# Patient Record
Sex: MALE | Race: WHITE | NOT HISPANIC OR LATINO | Employment: FULL TIME | URBAN - METROPOLITAN AREA
[De-identification: names, ages, dates, MRNs, and addresses within clinical notes are randomized per-mention and may not be internally consistent; named-entity substitution may affect disease eponyms.]

---

## 2017-04-24 ENCOUNTER — OFFICE VISIT (OUTPATIENT)
Dept: CARDIOLOGY | Facility: CLINIC | Age: 57
End: 2017-04-24

## 2017-04-24 VITALS
WEIGHT: 172 LBS | HEIGHT: 73 IN | HEART RATE: 71 BPM | SYSTOLIC BLOOD PRESSURE: 126 MMHG | DIASTOLIC BLOOD PRESSURE: 62 MMHG | BODY MASS INDEX: 22.8 KG/M2

## 2017-04-24 DIAGNOSIS — I42.1 HOCM (HYPERTROPHIC OBSTRUCTIVE CARDIOMYOPATHY) (HCC): Primary | ICD-10-CM

## 2017-04-24 PROCEDURE — 99213 OFFICE O/P EST LOW 20 MIN: CPT | Performed by: INTERNAL MEDICINE

## 2017-04-27 PROCEDURE — 93000 ELECTROCARDIOGRAM COMPLETE: CPT | Performed by: INTERNAL MEDICINE

## 2017-04-27 NOTE — PROGRESS NOTES
Subjective:     Encounter Date:04/24/2017      Patient ID: Dom Romo is a 57 y.o. male.    Chief Complaint: HOCM    History of Present Illness     Dear Dr. Omer,      I had the pleasure of seeing  the patient in cardiac followup today.  As you well know, he is a sebas 57-year-old man with a history of hypertrophic obstructive cardiomyopathy.  Many years ago we performed an alcohol septal ablation and he has done very well ever since. He has had no recurrent symptoms of heart failure or syncope.      He has been getting ready to go to [place] with his girlfriend. He plans to be on a plane for some time and do a lot of walking. He would like to try to decrease or eliminate his verapamil.           Review of Systems   All other systems reviewed and are negative.        ECG 12 Lead  Date/Time: 4/27/2017 11:38 AM  Performed by: DOM BROCK  Authorized by: DOM BROCK   Comparison: compared with previous ECG   Similar to previous ECG  Rhythm: sinus rhythm  BPM: 71  T depression: V3, V4, V5, V6, I, aVL, II, III and aVF                 Objective:     Physical Exam   Constitutional: He is oriented to person, place, and time. He appears well-developed and well-nourished.   HENT:   Head: Normocephalic and atraumatic.   Neck: Normal range of motion. Neck supple.   Cardiovascular: Normal rate, regular rhythm and normal heart sounds.    Pulmonary/Chest: Effort normal and breath sounds normal.   Abdominal: Soft. Bowel sounds are normal.   Musculoskeletal: Normal range of motion.   Neurological: He is alert and oriented to person, place, and time.   Skin: Skin is warm and dry.   Psychiatric: He has a normal mood and affect. His behavior is normal. Thought content normal.   Vitals reviewed.      Lab Review:       Assessment:          Diagnosis Plan   1. HOCM (hypertrophic obstructive cardiomyopathy)            Plan:        It was a pleasure to see the patient in cardiac followup today. He is doing very well from the  standpoint of his hypertrophic cardiomyopathy.  At this time, he has no symptoms of angina or heart failure.   He may cut his verapamil dose in half.   If he continues to do well in one month he can discontinue his verapamil at that time.       He will continue to see me once a year, or sooner if symptoms warrant.

## 2018-05-15 ENCOUNTER — OFFICE VISIT (OUTPATIENT)
Dept: CARDIOLOGY | Facility: CLINIC | Age: 58
End: 2018-05-15

## 2018-05-15 VITALS
BODY MASS INDEX: 23.06 KG/M2 | WEIGHT: 174 LBS | SYSTOLIC BLOOD PRESSURE: 120 MMHG | OXYGEN SATURATION: 100 % | HEART RATE: 77 BPM | DIASTOLIC BLOOD PRESSURE: 76 MMHG | HEIGHT: 73 IN

## 2018-05-15 DIAGNOSIS — I42.1 CARDIOMYOPATHY, HYPERTROPHIC OBSTRUCTIVE (HCC): Primary | ICD-10-CM

## 2018-05-15 PROBLEM — I51.9 CARDIAC DISEASE: Status: ACTIVE | Noted: 2018-05-15

## 2018-05-15 PROBLEM — F32.A DEPRESSION: Status: ACTIVE | Noted: 2018-05-15

## 2018-05-15 PROBLEM — F41.9 ANXIETY: Status: ACTIVE | Noted: 2018-05-15

## 2018-05-15 PROCEDURE — 99213 OFFICE O/P EST LOW 20 MIN: CPT | Performed by: PHYSICIAN ASSISTANT

## 2018-05-15 PROCEDURE — 93000 ELECTROCARDIOGRAM COMPLETE: CPT | Performed by: PHYSICIAN ASSISTANT

## 2018-05-15 NOTE — PROGRESS NOTES
Date of Office Visit: 05/15/2018  Encounter Provider: SCOT Gonzalez  Place of Service: [facility] CARDIOLOGY  Patient Name: Dom Romo  :1960    Chief Complaint   Patient presents with   • Cardiomyopathy     1 year follow up   :     HPI: Dom Romo is a 58 y.o. male, new to me, who presents today for follow-up.  Old records have been obtained and reviewed by me.  He is a patient of Dr. Espinoza's with a past cardiac history significant for hypertrophic obstructive cardiomyopathy.  He is status post alcohol septal ablation many years ago.  He was last in our office to see Dr. Espinoza on 2017.  At that visit, he was doing well without complaints of heart failure or syncope.  He was preparing to go to [place] with his girlfriend.  He was interested in decreasing or eliminating his verapamil.  Dr. Espinoza advised him to cut his dose of verapamil and half, and after 1 month.  He still doing well then to stop it altogether.  He is here today for yearly visit.   Over the past year he's been doing well.  He did take the trip to [place] with his girlfriend, who was born there.  He was gone for a month.  He walks about 5 miles a day, he measures this with his FitBit.  He denies any chest pain, shortness of breath, palpitations, edema, dizziness, or syncope.  He was able to discontinue his verapamil without difficulty.      Past Medical History:   Diagnosis Date   • Cardiomyopathy    • Depression    • HOCM (hypertrophic obstructive cardiomyopathy)        Past Surgical History:   Procedure Laterality Date   • CARDIAC ABLATION         Social History     Social History   • Marital status: Single     Spouse name: N/A   • Number of children: N/A   • Years of education: N/A     Occupational History   • Not on file.     Social History Main Topics   • Smoking status: Never Smoker   • Smokeless tobacco: Never Used      Comment: caffeine use   • Alcohol use 0.6 oz/week     1 Glasses of wine per week  "     Comment: rare   • Drug use: No   • Sexual activity: Defer     Other Topics Concern   • Not on file     Social History Narrative   • No narrative on file       Family History   Problem Relation Age of Onset   • Hypertension Other    • Stroke Other    • Hypotension Mother    • No Known Problems Father    • No Known Problems Maternal Grandmother    • No Known Problems Maternal Grandfather    • No Known Problems Paternal Grandmother    • No Known Problems Paternal Grandfather        Review of Systems   Constitution: Negative for chills, fever and malaise/fatigue.   Cardiovascular: Negative for chest pain, dyspnea on exertion, leg swelling, near-syncope, orthopnea, palpitations, paroxysmal nocturnal dyspnea and syncope.   Respiratory: Negative for cough and shortness of breath.    Musculoskeletal: Negative for joint pain, joint swelling and myalgias.   Gastrointestinal: Negative for abdominal pain, diarrhea, melena, nausea and vomiting.   Genitourinary: Negative for frequency and hematuria.   Neurological: Negative for light-headedness, numbness, paresthesias and seizures.   Allergic/Immunologic: Negative.    All other systems reviewed and are negative.      No Known Allergies    No current outpatient prescriptions on file.      Objective:     Vitals:    05/15/18 1357 05/15/18 1406   BP: 116/72 120/76   BP Location: Right arm Left arm   Pulse: 77    SpO2: 100%    Weight: 78.9 kg (174 lb)    Height: 185.4 cm (73\")      Body mass index is 22.96 kg/m².    PHYSICAL EXAM:    Physical Exam   Constitutional: He is oriented to person, place, and time. He appears well-developed and well-nourished. No distress.   HENT:   Head: Normocephalic and atraumatic.   Eyes: Pupils are equal, round, and reactive to light.   Neck: No JVD present. No thyromegaly present.   Cardiovascular: Normal rate, regular rhythm, normal heart sounds and intact distal pulses.    No murmur heard.  Pulmonary/Chest: Effort normal and breath sounds normal. " No respiratory distress.   Abdominal: Soft. Bowel sounds are normal. He exhibits no distension. There is no splenomegaly or hepatomegaly. There is no tenderness.   Musculoskeletal: Normal range of motion. He exhibits no edema.   Neurological: He is alert and oriented to person, place, and time.   Skin: Skin is warm and dry. He is not diaphoretic. No erythema.   Psychiatric: He has a normal mood and affect. His behavior is normal. Judgment normal.         ECG 12 Lead  Date/Time: 5/15/2018 2:15 PM  Performed by: GUNJAN RIVERA.  Authorized by: GUNJAN RIVERA   Comparison: compared with previous ECG from 4/24/2017  Similar to previous ECG  Rhythm: sinus rhythm  BPM: 74  Clinical impression: normal ECG  Comments: Indication: Hypertrophic obstructive cardiomyopathy.              Assessment:       Diagnosis Plan   1. Cardiomyopathy, hypertrophic obstructive  ECG 12 Lead     Orders Placed This Encounter   Procedures   • ECG 12 Lead     This order was created via procedure documentation          Plan:       Overall he's doing well.  He has no symptoms of heart failure or syncope.  He gets plenty of exercise without difficulty.  I'm not going to make any changes to his medical regimen today, and he will follow-up with Dr. Espinoza in 1 year.    As always, it has been a pleasure to participate in your patient's care.      Sincerely,         Gunjan Rivera PA-C

## 2019-06-24 ENCOUNTER — OFFICE VISIT (OUTPATIENT)
Dept: CARDIOLOGY | Facility: CLINIC | Age: 59
End: 2019-06-24

## 2019-06-24 VITALS
HEART RATE: 80 BPM | DIASTOLIC BLOOD PRESSURE: 94 MMHG | WEIGHT: 176.2 LBS | SYSTOLIC BLOOD PRESSURE: 148 MMHG | HEIGHT: 73 IN | OXYGEN SATURATION: 96 % | BODY MASS INDEX: 23.35 KG/M2

## 2019-06-24 DIAGNOSIS — I42.1 CARDIOMYOPATHY, HYPERTROPHIC OBSTRUCTIVE (HCC): Primary | ICD-10-CM

## 2019-06-24 PROCEDURE — 99213 OFFICE O/P EST LOW 20 MIN: CPT | Performed by: INTERNAL MEDICINE

## 2019-06-24 PROCEDURE — 93000 ELECTROCARDIOGRAM COMPLETE: CPT | Performed by: INTERNAL MEDICINE

## 2019-06-27 NOTE — PROGRESS NOTES
Subjective:     Encounter Date:06/24/2019      Patient ID: Dom Romo is a 59 y.o. male.    [facility]    History of Present Illness    Dear Dr. Omer:    I had the pleasure of seeing your patient in cardiac followup today. As you well know, he is a sebas 59-year-old man with history of hypertrophic obstructive cardiomyopathy. He had an alcohol septal ablation many years ago and did well. He has not had heart failure symptoms since.    He is now engaged to be . He visited [place] to see his wife's parents. He was there for 6 weeks climbing up and down mountains without any symptoms. He no longer takes any medicines at his request.        Review of Systems   All other systems reviewed and are negative.        ECG 12 Lead  Date/Time: 6/24/2019 1:34 PM  Performed by: Dom Espinoza MD  Authorized by: Dom Espinoza MD   Comparison: compared with previous ECG   Similar to previous ECG  Rhythm: sinus rhythm  BPM: 74  Other findings: left ventricular hypertrophy               Objective:     Physical Exam   Constitutional: He is oriented to person, place, and time. He appears well-developed and well-nourished.   HENT:   Head: Normocephalic and atraumatic.   Neck: Normal range of motion. Neck supple.   Cardiovascular: Normal rate, regular rhythm and normal heart sounds.   Pulmonary/Chest: Effort normal and breath sounds normal.   Abdominal: Soft. Bowel sounds are normal.   Musculoskeletal: Normal range of motion.   Neurological: He is alert and oriented to person, place, and time.   Skin: Skin is warm and dry.   Psychiatric: He has a normal mood and affect. His behavior is normal. Thought content normal.   Vitals reviewed.      Lab Review:       Assessment:          Diagnosis Plan   1. Cardiomyopathy, hypertrophic obstructive (CMS/HCC)            Plan:       It was a pleasure to see your patient in cardiac follow up today.  He is doing very well from the cardiac standpoint without any complaints.  I have  asked him to watch his blood pressure closely.  If it remains elevated, he should consider going back on beta blocker.  I am pleased that he is asymptomatic from his HOCM.  He will see us again in 1 year or sooner as symptoms warrant.

## 2020-06-26 ENCOUNTER — OFFICE VISIT (OUTPATIENT)
Dept: CARDIOLOGY | Facility: CLINIC | Age: 60
End: 2020-06-26

## 2020-06-26 VITALS
WEIGHT: 171 LBS | DIASTOLIC BLOOD PRESSURE: 86 MMHG | HEIGHT: 73 IN | HEART RATE: 89 BPM | BODY MASS INDEX: 22.66 KG/M2 | SYSTOLIC BLOOD PRESSURE: 124 MMHG

## 2020-06-26 DIAGNOSIS — I42.1 CARDIOMYOPATHY, HYPERTROPHIC OBSTRUCTIVE (HCC): Primary | ICD-10-CM

## 2020-06-26 DIAGNOSIS — I48.0 PAROXYSMAL ATRIAL FIBRILLATION (HCC): ICD-10-CM

## 2020-06-26 PROCEDURE — 93000 ELECTROCARDIOGRAM COMPLETE: CPT | Performed by: INTERNAL MEDICINE

## 2020-06-26 PROCEDURE — 99204 OFFICE O/P NEW MOD 45 MIN: CPT | Performed by: INTERNAL MEDICINE

## 2020-06-26 NOTE — PROGRESS NOTES
Subjective:     Encounter Date:06/26/2020      Patient ID: Dom Romo is a 60 y.o. male.    Chief Complaint: Hypertrophic cardiomyopathy    HPI:   This is a 60-year-old man he was previously followed by Dr. Donaldo Espinoza.  He has a history of hypertrophic obstructive cardiomyopathy.  He himself is never had a sudden death.  He did undergo alcohol septal ablation many years ago.  None of the prior testing is available to me; I do not know any of his previous gradients.  He says that since his ablation he is done quite well.  He has had no arrhythmias or symptoms or signs of heart failure.    His brother did have sudden death related to HOCM.     Today he presents for follow-up.  He has no complaints.  He had an EKG which shows new atrial fibrillation which he believes is a new diagnosis for him.  He notes that on his apple watch the last 2 weeks he has been getting alerts for an irregular heartbeat     He does yard work for exercise and has been feeling well doing that.  He had one episode of dizziness which she attributed to working in the heat.  He does not smoke and drinks alcohol occasionally.    The following portions of the patient's history were reviewed and updated as appropriate: allergies, current medications, past family history, past medical history, past social history, past surgical history and problem list.     REVIEW OF SYSTEMS:   All systems reviewed.  Pertinent positives identified in HPI.  All other systems are negative.    Past Medical History:   Diagnosis Date   • Cardiomyopathy (CMS/HCC)    • Depression    • HOCM (hypertrophic obstructive cardiomyopathy) (CMS/HCC)        Family History   Problem Relation Age of Onset   • Hypertension Other    • Stroke Other    • Hypotension Mother    • No Known Problems Father    • No Known Problems Maternal Grandmother    • No Known Problems Maternal Grandfather    • No Known Problems Paternal Grandmother    • No Known Problems Paternal Grandfather         Social History     Socioeconomic History   • Marital status: Single     Spouse name: Not on file   • Number of children: Not on file   • Years of education: Not on file   • Highest education level: Not on file   Tobacco Use   • Smoking status: Never Smoker   • Smokeless tobacco: Never Used   • Tobacco comment: caffeine use   Substance and Sexual Activity   • Alcohol use: Yes     Alcohol/week: 1.0 standard drinks     Types: 1 Glasses of wine per week     Comment: rare   • Drug use: No   • Sexual activity: Defer       No Known Allergies    Past Surgical History:   Procedure Laterality Date   • CARDIAC ABLATION           ECG 12 Lead  Date/Time: 6/26/2020 3:07 PM  Performed by: Krys Villasenor MD  Authorized by: Krys Villasenor MD   Comparison: compared with previous ECG from 6/24/2019  Rhythm: atrial fibrillation  Rate: normal  Conduction: conduction normal  ST Segments: ST segments normal  T inversion: V5 and V6  QRS axis: normal  Other findings: non-specific ST-T wave changes and left ventricular hypertrophy    Clinical impression: abnormal EKG               Objective:         PHYSICAL EXAM:  GEN: VSS, no distress,   Eyes: normal sclera, normal lids and lashes  HENT: moist mucus membranes,   Respiratory: CTAB, no rales or wheezes  CV: irregular RR, no murmurs, , +2 DP and 2+ carotid pulses b/l  GI: NABS, soft,  Nontender, nondistended  MSK: no edema, no scoliosis or kyphosis  Skin: no rash, warm, dry  Heme/Lymph: no bruising or bleeding  Psych: organized thought, normal behavior and affect  Neuro: Cranial nerves grossly intact, Alert and Oriented x 3.         Assessment:          Diagnosis Plan   1. Cardiomyopathy, hypertrophic obstructive (CMS/HCC)     2. Paroxysmal atrial fibrillation (CMS/HCC)            Plan:       1. HOCM: s/p alcohol septal ablation. RF for sudden death include his brother's sudden death. No recent echo to assess gradient. Will repeat today.   2. AF: new diagnosis. He is asymptomatic,  though a few weeks ago did have some dizziness when working in the heat. Based on the echo we can make further decisions on cardioversion, etc. I will plan to get a ZIO to assess AF burden as well; despite his CHADSVASC of 0 I think given his HoCM if he has a large burden of AF he would justify anticoagulation given higher stroke risk in these patients.      Dr. Omer,  thank you very much for referring this kind patient to me. Please call me with any questions or concerns. I will see the patient again in the office in 3 months or sooner based on results of his testing.          Krys Villasenor MD  06/26/20  [facility]    No outpatient encounter medications on file as of 6/26/2020.     No facility-administered encounter medications on file as of 6/26/2020.

## 2020-07-17 ENCOUNTER — TELEPHONE (OUTPATIENT)
Dept: CARDIOLOGY | Facility: CLINIC | Age: 60
End: 2020-07-17

## 2020-07-17 DIAGNOSIS — I42.1 CARDIOMYOPATHY, HYPERTROPHIC OBSTRUCTIVE (HCC): Primary | ICD-10-CM

## 2020-07-17 RX ORDER — VERAPAMIL HYDROCHLORIDE 240 MG/1
240 TABLET, FILM COATED, EXTENDED RELEASE ORAL DAILY
Qty: 30 TABLET | Refills: 11 | Status: SHIPPED | OUTPATIENT
Start: 2020-07-17 | End: 2020-08-04 | Stop reason: SDUPTHER

## 2020-07-17 NOTE — TELEPHONE ENCOUNTER
Long discussion about ZIO results. Having 100% AF with rates up to 190.  Plan:   Previously tolerated verapamil- start 240 daily  Start Xarelto  Echo   Recent thyroid labs have been normal. He has not been drinking excessively.

## 2020-07-17 NOTE — TELEPHONE ENCOUNTER
Not yet discussed with patient but, after echo based on measurements will have to discuss if ICD should be considered given fast and long NSVT noted on ZIO- increasing risk of ventricular arrhythmia but he has no other risk factors for SCD (no syncope, personal or family history SCD etc)

## 2020-07-30 ENCOUNTER — HOSPITAL ENCOUNTER (OUTPATIENT)
Dept: CARDIOLOGY | Facility: HOSPITAL | Age: 60
Discharge: HOME | End: 2020-07-30
Admitting: INTERNAL MEDICINE

## 2020-07-30 VITALS
HEIGHT: 73 IN | BODY MASS INDEX: 22.66 KG/M2 | WEIGHT: 171 LBS | HEART RATE: 80 BPM | SYSTOLIC BLOOD PRESSURE: 124 MMHG | DIASTOLIC BLOOD PRESSURE: 86 MMHG

## 2020-07-30 DIAGNOSIS — I42.1 CARDIOMYOPATHY, HYPERTROPHIC OBSTRUCTIVE (HCC): ICD-10-CM

## 2020-07-30 LAB
AORTIC ARCH: 2.9 CM
ASCENDING AORTA: 3.7 CM
BH CV ECHO MEAS - ACS: 2.2 CM
BH CV ECHO MEAS - AO MAX PG (FULL): 1.8 MMHG
BH CV ECHO MEAS - AO MAX PG: 4.2 MMHG
BH CV ECHO MEAS - AO MEAN PG (FULL): 1 MMHG
BH CV ECHO MEAS - AO MEAN PG: 2 MMHG
BH CV ECHO MEAS - AO V2 MAX: 103 CM/SEC
BH CV ECHO MEAS - AO V2 MEAN: 54.8 CM/SEC
BH CV ECHO MEAS - AO V2 VTI: 15.8 CM
BH CV ECHO MEAS - ASC AORTA: 3.7 CM
BH CV ECHO MEAS - BSA(HAYCOCK): 2 M^2
BH CV ECHO MEAS - BSA: 2 M^2
BH CV ECHO MEAS - BZI_BMI: 22.6 KILOGRAMS/M^2
BH CV ECHO MEAS - BZI_METRIC_HEIGHT: 185.4 CM
BH CV ECHO MEAS - BZI_METRIC_WEIGHT: 77.6 KG
BH CV ECHO MEAS - EDV(MOD-SP2): 56 ML
BH CV ECHO MEAS - EDV(MOD-SP4): 50 ML
BH CV ECHO MEAS - EDV(TEICH): 123.8 ML
BH CV ECHO MEAS - EF(CUBED): 64.8 %
BH CV ECHO MEAS - EF(MOD-BP): 51.4 %
BH CV ECHO MEAS - EF(MOD-SP2): 51.8 %
BH CV ECHO MEAS - EF(MOD-SP4): 50 %
BH CV ECHO MEAS - EF(TEICH): 56 %
BH CV ECHO MEAS - ESV(MOD-SP2): 27 ML
BH CV ECHO MEAS - ESV(MOD-SP4): 25 ML
BH CV ECHO MEAS - ESV(TEICH): 54.4 ML
BH CV ECHO MEAS - FS: 29.4 %
BH CV ECHO MEAS - IVS/LVPW: 1.1
BH CV ECHO MEAS - IVSD: 1 CM
BH CV ECHO MEAS - LV DIASTOLIC VOL/BSA (35-75): 24.8 ML/M^2
BH CV ECHO MEAS - LV MASS(C)D: 175.6 GRAMS
BH CV ECHO MEAS - LV MASS(C)DI: 87.2 GRAMS/M^2
BH CV ECHO MEAS - LV MAX PG: 2.5 MMHG
BH CV ECHO MEAS - LV MEAN PG: 1 MMHG
BH CV ECHO MEAS - LV SYSTOLIC VOL/BSA (12-30): 12.4 ML/M^2
BH CV ECHO MEAS - LV V1 MAX: 78.3 CM/SEC
BH CV ECHO MEAS - LV V1 MEAN: 45.5 CM/SEC
BH CV ECHO MEAS - LV V1 VTI: 11.5 CM
BH CV ECHO MEAS - LVIDD: 5.1 CM
BH CV ECHO MEAS - LVIDS: 3.6 CM
BH CV ECHO MEAS - LVLD AP2: 7.9 CM
BH CV ECHO MEAS - LVLD AP4: 7.3 CM
BH CV ECHO MEAS - LVLS AP2: 7.2 CM
BH CV ECHO MEAS - LVLS AP4: 6.7 CM
BH CV ECHO MEAS - LVPWD: 0.9 CM
BH CV ECHO MEAS - MR MAX PG: 74 MMHG
BH CV ECHO MEAS - MR MAX VEL: 430 CM/SEC
BH CV ECHO MEAS - MV DEC SLOPE: 335 CM/SEC^2
BH CV ECHO MEAS - MV DEC TIME: 0.21 SEC
BH CV ECHO MEAS - MV E MAX VEL: 69 CM/SEC
BH CV ECHO MEAS - MV MAX PG: 3.1 MMHG
BH CV ECHO MEAS - MV MEAN PG: 1 MMHG
BH CV ECHO MEAS - MV P1/2T MAX VEL: 71.6 CM/SEC
BH CV ECHO MEAS - MV P1/2T: 62.6 MSEC
BH CV ECHO MEAS - MV V2 MAX: 88.3 CM/SEC
BH CV ECHO MEAS - MV V2 MEAN: 47.1 CM/SEC
BH CV ECHO MEAS - MV V2 VTI: 18.6 CM
BH CV ECHO MEAS - MVA P1/2T LCG: 3.1 CM^2
BH CV ECHO MEAS - MVA(P1/2T): 3.5 CM^2
BH CV ECHO MEAS - PA MAX PG (FULL): 1.7 MMHG
BH CV ECHO MEAS - PA MAX PG: 2.3 MMHG
BH CV ECHO MEAS - PA V2 MAX: 76.6 CM/SEC
BH CV ECHO MEAS - PVA(V,A): 2.3 CM^2
BH CV ECHO MEAS - PVA(V,D): 2.3 CM^2
BH CV ECHO MEAS - RAP SYSTOLE: 8 MMHG
BH CV ECHO MEAS - RV MAX PG: 0.61 MMHG
BH CV ECHO MEAS - RV MEAN PG: 0 MMHG
BH CV ECHO MEAS - RV V1 MAX: 39.2 CM/SEC
BH CV ECHO MEAS - RV V1 MEAN: 26.1 CM/SEC
BH CV ECHO MEAS - RV V1 VTI: 8.3 CM
BH CV ECHO MEAS - RVOT AREA: 4.5 CM^2
BH CV ECHO MEAS - RVOT DIAM: 2.4 CM
BH CV ECHO MEAS - RVSP: 34 MMHG
BH CV ECHO MEAS - SI(CUBED): 42.7 ML/M^2
BH CV ECHO MEAS - SI(MOD-SP2): 14.4 ML/M^2
BH CV ECHO MEAS - SI(MOD-SP4): 12.4 ML/M^2
BH CV ECHO MEAS - SI(TEICH): 34.5 ML/M^2
BH CV ECHO MEAS - SUP REN AO DIAM: 1.7 CM
BH CV ECHO MEAS - SV(CUBED): 86 ML
BH CV ECHO MEAS - SV(MOD-SP2): 29 ML
BH CV ECHO MEAS - SV(MOD-SP4): 25 ML
BH CV ECHO MEAS - SV(RVOT): 37.7 ML
BH CV ECHO MEAS - SV(TEICH): 69.4 ML
BH CV ECHO MEAS - TAPSE (>1.6): 1.9 CM2
BH CV ECHO MEAS - TR MAX VEL: 255 CM/SEC
BH CV XLRA - RV BASE: 3.5 CM
BH CV XLRA - RV LENGTH: 6.1 CM
BH CV XLRA - RV MID: 2.7 CM
BH CV XLRA - TDI S': 10 CM/SEC
LEFT ATRIUM VOLUME INDEX: 37 ML/M2
LV EF 2D ECHO EST: 51 %
MAXIMAL PREDICTED HEART RATE: 160 BPM
SINUS: 4 CM
STJ: 3 CM
STRESS TARGET HR: 136 BPM

## 2020-07-30 PROCEDURE — 93306 TTE W/DOPPLER COMPLETE: CPT

## 2020-07-30 PROCEDURE — 93306 TTE W/DOPPLER COMPLETE: CPT | Performed by: INTERNAL MEDICINE

## 2020-07-30 PROCEDURE — 93356 MYOCRD STRAIN IMG SPCKL TRCK: CPT | Performed by: INTERNAL MEDICINE

## 2020-07-30 PROCEDURE — 93356 MYOCRD STRAIN IMG SPCKL TRCK: CPT

## 2020-08-03 ENCOUNTER — TELEPHONE (OUTPATIENT)
Dept: CARDIOLOGY | Facility: CLINIC | Age: 60
End: 2020-08-03

## 2020-08-03 NOTE — TELEPHONE ENCOUNTER
Pt called back and left message stating it still feels like his heart is racing and there is no change since starting verapamil.

## 2020-08-03 NOTE — TELEPHONE ENCOUNTER
Called patient to give results of echo. Left VM to call me back along with how his HR has been doing wince starting verapamil.

## 2020-08-04 RX ORDER — VERAPAMIL HYDROCHLORIDE 240 MG/1
TABLET, FILM COATED, EXTENDED RELEASE ORAL
Qty: 30 TABLET | Refills: 11 | Status: SHIPPED | OUTPATIENT
Start: 2020-08-04 | End: 2020-09-08 | Stop reason: SDUPTHER

## 2020-08-04 NOTE — TELEPHONE ENCOUNTER
Pt has not been checking bp or hr since starting medication. He states the racing heart usually happens after he takes the verapamil in the evening.     He is also stating that he has noted some swelling in the evening when he takes his socks and shoes off,  No sob or chest pain

## 2020-08-07 ENCOUNTER — TELEPHONE (OUTPATIENT)
Dept: CARDIOLOGY | Facility: CLINIC | Age: 60
End: 2020-08-07

## 2020-08-07 NOTE — TELEPHONE ENCOUNTER
Pt called in follow up bp readings . Pt is not c/o any symptoms.    Currently taking     xarelto 20mg daily   Verapamil  mg 1.5 tab every evening      08/4    /83 hr 78   /80  Hr 88 (afib) per apple watch    08/05   AM  126/78 hr 84   PM  131/78  Hr 99 (afib)    08/06    AM  102/67  Hr 64   PM  134/79  Hr 91 (afib)    08/07   AM  117/69  Hr 61 (afib)

## 2020-09-08 ENCOUNTER — TELEPHONE (OUTPATIENT)
Dept: CARDIOLOGY | Facility: CLINIC | Age: 60
End: 2020-09-08

## 2020-09-08 RX ORDER — VERAPAMIL HYDROCHLORIDE 240 MG/1
TABLET, FILM COATED, EXTENDED RELEASE ORAL
Qty: 135 TABLET | Refills: 1 | Status: SHIPPED | OUTPATIENT
Start: 2020-09-08 | End: 2021-03-04

## 2020-09-18 ENCOUNTER — OFFICE VISIT (OUTPATIENT)
Dept: CARDIOLOGY | Facility: CLINIC | Age: 60
End: 2020-09-18

## 2020-09-18 VITALS
SYSTOLIC BLOOD PRESSURE: 144 MMHG | HEIGHT: 73 IN | WEIGHT: 170 LBS | HEART RATE: 85 BPM | BODY MASS INDEX: 22.53 KG/M2 | DIASTOLIC BLOOD PRESSURE: 96 MMHG

## 2020-09-18 DIAGNOSIS — I48.91 ATRIAL FIBRILLATION, UNSPECIFIED TYPE (HCC): ICD-10-CM

## 2020-09-18 DIAGNOSIS — I42.1 CARDIOMYOPATHY, HYPERTROPHIC OBSTRUCTIVE (HCC): Primary | ICD-10-CM

## 2020-09-18 PROCEDURE — 93000 ELECTROCARDIOGRAM COMPLETE: CPT | Performed by: INTERNAL MEDICINE

## 2020-09-18 PROCEDURE — 99214 OFFICE O/P EST MOD 30 MIN: CPT | Performed by: INTERNAL MEDICINE

## 2020-09-18 NOTE — PROGRESS NOTES
Subjective:     Encounter Date: 09/18/20      Patient ID: Dom Romo is a 60 y.o. male.    Chief Complaint: Hypertrophic cardiomyopathy    HPI:   This is a 60-year-old man he was previously followed by Dr. Donaldo Espinoza.  He has a history of hypertrophic obstructive cardiomyopathy.  He himself has never had a sudden death.  He did undergo alcohol septal ablation many years ago.  His brother did have sudden death related to HOCM.     The last time I saw him his Apple Watch was alerting him to episodes of AF. He had an EKG showing AF. He was started on Xarelto and Verapamil for rate control. He had an echo which showed no residual gradient and normal function.   He has tolerated his new meds well. He has rare alerts of AF with rates in the 100s infrequently. He has no dizziness, palpitations, or syncope. No angina or orhtopnea/PND. Since starting verapamil he has had some mild LE edema which is not bothersome. No rectal bleeding noticed.     He walks 4-5 times per week for exercise. He is nervous about the election.   He does not smoke and drinks alcohol occasionally.    The following portions of the patient's history were reviewed and updated as appropriate: allergies, current medications, past family history, past medical history, past social history, past surgical history and problem list.     REVIEW OF SYSTEMS:   All systems reviewed.  Pertinent positives identified in HPI.  All other systems are negative.    Past Medical History:   Diagnosis Date   • Cardiomyopathy (CMS/HCC)    • Depression    • HOCM (hypertrophic obstructive cardiomyopathy) (CMS/HCC)        Family History   Problem Relation Age of Onset   • Hypertension Other    • Stroke Other    • Hypotension Mother    • No Known Problems Father    • No Known Problems Maternal Grandmother    • No Known Problems Maternal Grandfather    • No Known Problems Paternal Grandmother    • No Known Problems Paternal Grandfather        Social History     Socioeconomic  History   • Marital status: Single     Spouse name: Not on file   • Number of children: Not on file   • Years of education: Not on file   • Highest education level: Not on file   Tobacco Use   • Smoking status: Never Smoker   • Smokeless tobacco: Never Used   • Tobacco comment: caffeine use   Substance and Sexual Activity   • Alcohol use: Yes     Alcohol/week: 1.0 standard drinks     Types: 1 Glasses of wine per week     Comment: rare   • Drug use: No   • Sexual activity: Defer       No Known Allergies    Past Surgical History:   Procedure Laterality Date   • CARDIAC ABLATION           ECG 12 Lead    Date/Time: 9/18/2020 5:46 PM  Performed by: Krys Villasenor MD  Authorized by: Krys Villasenor MD   Comparison: compared with previous ECG from 6/26/2020  Similar to previous ECG  Rhythm: sinus rhythm  Rate: normal  Conduction: conduction normal  ST Segments: ST segments normal  T inversion: V6 and V5  T flattening: II, III, aVF, I and aVL  QRS axis: normal  Other findings: non-specific ST-T wave changes    Clinical impression: abnormal EKG             Objective:         PHYSICAL EXAM:  GEN: VSS, no distress,   Eyes: normal sclera, normal lids and lashes  HENT: moist mucus membranes,   Respiratory: CTAB, no rales or wheezes  CV: irregular RR, no murmurs, , +2 DP and 2+ carotid pulses b/l  GI: NABS, soft,  Nontender, nondistended  MSK: no edema, no scoliosis or kyphosis  Skin: no rash, warm, dry  Heme/Lymph: no bruising or bleeding  Psych: organized thought, normal behavior and affect  Neuro: Cranial nerves grossly intact, Alert and Oriented x 3.         Assessment:          Diagnosis Plan   1. Cardiomyopathy, hypertrophic obstructive (CMS/HCC)     2. Atrial fibrillation, unspecified type (CMS/HCC)            Plan:       1. HOCM: s/p alcohol septal ablation. RF for sudden death include his brother's sudden death. Repeat echo shows no gradient.   2. AF: Asymptomatic. High risk of stroke in HOCM patients with AF no matter  the CHADSVASC score, will therefore plan for indefinite AC with Xarelto. Has some mild edema with verapamil, but he prefers to keep this medication rather than attempting a BB. Continue to monitoir.     Dr. Omer,  thank you very much for referring this kind patient to me. Please call me with any questions or concerns. I will see the patient again in the office in 6  Months.       Krys Villasenor MD  09/18/20  [facility]    Outpatient Encounter Medications as of 9/18/2020   Medication Sig Dispense Refill   • rivaroxaban (XARELTO) 20 MG tablet Take 1 tablet by mouth Daily. 90 tablet 1   • verapamil SR (CALAN-SR) 240 MG CR tablet 1.5 tablets daily 135 tablet 1     No facility-administered encounter medications on file as of 9/18/2020.

## 2021-03-04 RX ORDER — RIVAROXABAN 20 MG/1
TABLET, FILM COATED ORAL
Qty: 90 TABLET | Refills: 0 | Status: SHIPPED | OUTPATIENT
Start: 2021-03-04 | End: 2021-06-29

## 2021-03-04 RX ORDER — VERAPAMIL HYDROCHLORIDE 240 MG/1
TABLET, FILM COATED, EXTENDED RELEASE ORAL
Qty: 135 TABLET | Refills: 0 | Status: SHIPPED | OUTPATIENT
Start: 2021-03-04 | End: 2021-06-29

## 2021-03-29 ENCOUNTER — OFFICE VISIT (OUTPATIENT)
Dept: CARDIOLOGY | Facility: CLINIC | Age: 61
End: 2021-03-29

## 2021-03-29 VITALS — BODY MASS INDEX: 23.59 KG/M2 | HEIGHT: 73 IN | WEIGHT: 178 LBS

## 2021-03-29 DIAGNOSIS — I48.91 ATRIAL FIBRILLATION, UNSPECIFIED TYPE (HCC): ICD-10-CM

## 2021-03-29 DIAGNOSIS — I42.1 CARDIOMYOPATHY, HYPERTROPHIC OBSTRUCTIVE (HCC): Primary | ICD-10-CM

## 2021-03-29 DIAGNOSIS — R42 DIZZINESS: ICD-10-CM

## 2021-03-29 PROCEDURE — 99214 OFFICE O/P EST MOD 30 MIN: CPT | Performed by: INTERNAL MEDICINE

## 2021-03-29 PROCEDURE — 93000 ELECTROCARDIOGRAM COMPLETE: CPT | Performed by: INTERNAL MEDICINE

## 2021-03-29 NOTE — PROGRESS NOTES
Subjective:     Encounter Date: 03/29/21      Patient ID: Dom Romo is a 61 y.o. male.    Chief Complaint: Hypertrophic cardiomyopathy    HPI:   This is a 61-year-old man who has a history of hypertrophic obstructive cardiomyopathy and AF.  He himself has never had a sudden death.  He did undergo alcohol septal ablation many years ago.  His brother did have sudden death related to HOCM.     In 2020 his Apple Watch alerted him to episodes of AF. He had an EKG showing AF. He was started on Xarelto and Verapamil for rate control. He had an echo which showed no residual gradient and normal function. His outpatient monitor showed uncontrolled rates up to 190 bpm  He has been feeling well up until yesterday.  He was doing yard work and had several episodes of brief dizziness.  He did not have any palpitations, dizziness or angina.  It resolved upon resting.  He does not really drink water throughout the day.  He has several cups of tea and usually skips lunch.  Today his orthostatics are abnormal.  Resting his blood pressure is 140/90.  Standing blood pressure 118/80.  Heart rates were not checked.  His apple watch frequently alerts him to A. fib but does not necessarily catch abnormal rates.  Today he is in atrial fibrillation but the watch is not picking it up.    He walks 4-5 times per week for exercise.   He does not smoke and drinks alcohol occasionally.    The following portions of the patient's history were reviewed and updated as appropriate: allergies, current medications, past family history, past medical history, past social history, past surgical history and problem list.     REVIEW OF SYSTEMS:   All systems reviewed.  Pertinent positives identified in HPI.  All other systems are negative.    Past Medical History:   Diagnosis Date   • Cardiomyopathy (CMS/HCC)    • Depression    • HOCM (hypertrophic obstructive cardiomyopathy) (CMS/HCC)        Family History   Problem Relation Age of Onset   •  Hypertension Other    • Stroke Other    • Hypotension Mother    • No Known Problems Father    • No Known Problems Maternal Grandmother    • No Known Problems Maternal Grandfather    • No Known Problems Paternal Grandmother    • No Known Problems Paternal Grandfather        Social History     Socioeconomic History   • Marital status: Single     Spouse name: Not on file   • Number of children: Not on file   • Years of education: Not on file   • Highest education level: Not on file   Tobacco Use   • Smoking status: Never Smoker   • Smokeless tobacco: Never Used   • Tobacco comment: caffeine use   Substance and Sexual Activity   • Alcohol use: Yes     Alcohol/week: 1.0 standard drinks     Types: 1 Glasses of wine per week     Comment: rare   • Drug use: No   • Sexual activity: Defer       No Known Allergies    Past Surgical History:   Procedure Laterality Date   • CARDIAC ABLATION           ECG 12 Lead    Date/Time: 3/29/2021 11:48 AM  Performed by: Krys Villasenor MD  Authorized by: Krys Villasenor MD   Comparison: compared with previous ECG from 9/18/2020  Rhythm: atrial fibrillation  Rate: normal  Conduction: right bundle branch block  T inversion: V1, V2 and V3  T flattening: V4, V5 and V6  QRS axis: normal  Other findings: non-specific ST-T wave changes and low voltage    Clinical impression: abnormal EKG             Objective:         PHYSICAL EXAM:  GEN: VSS, no distress,   Eyes: normal sclera, normal lids and lashes  HENT: moist mucus membranes,   Respiratory: CTAB, no rales or wheezes  CV: irregular RR, no murmurs, , +2 DP and 2+ carotid pulses b/l  GI: NABS, soft,  Nontender, nondistended  MSK: no edema, no scoliosis or kyphosis  Skin: no rash, warm, dry  Heme/Lymph: no bruising or bleeding  Psych: organized thought, normal behavior and affect  Neuro: Cranial nerves grossly intact, Alert and Oriented x 3.         Assessment:          Diagnosis Plan   1. Cardiomyopathy, hypertrophic obstructive (CMS/HCC)     2.  Atrial fibrillation, unspecified type (CMS/HCC)     3. Dizziness            Plan:       1. HOCM: s/p alcohol septal ablation. RF for sudden death include his brother's sudden death. Repeat echo shows no gradient.   2. AF: Asymptomatic.  Verapamil 240 and Xarelto for anticoagulation.  He is intermittently dizzy.  I think most likely this is due to dehydration/poor water intake as he is orthostatic today.  He will increase his fluids and if there is no improvement we will have him wear a 24-hour Holter monitor to assess his heart rate.  3.  Dizziness: As above    Dr. Omer,  thank you very much for referring this kind patient to me. Please call me with any questions or concerns. I will see the patient again in the office in 6 months.       Krys Villasenor MD  03/29/21  [facility]    Outpatient Encounter Medications as of 3/29/2021   Medication Sig Dispense Refill   • verapamil SR (CALAN-SR) 240 MG CR tablet TAKE ONE AND ONE-HALF TABLETS DAILY 135 tablet 0   • Xarelto 20 MG tablet TAKE 1 TABLET DAILY 90 tablet 0     No facility-administered encounter medications on file as of 3/29/2021.

## 2021-06-14 ENCOUNTER — TELEPHONE (OUTPATIENT)
Dept: CARDIOLOGY | Facility: CLINIC | Age: 61
End: 2021-06-14

## 2021-06-14 NOTE — TELEPHONE ENCOUNTER
Pt called with billing question.  I called pt back and gave him julián phone #. [phone].  However, after talking w/ the pt, he had spoken w/ the billing department, and they told him the claim wasn't something they would handle.    Date of Service was 7/17/2020.  The bill is coming from [employer]?    I see where the pt had a 72 hour ZIO/Holter Monitor in July 2020, and an ECHO.    Can someone please direct this patient to the correct area, or assist him?    Thank you,    Melva Peck, CMA

## 2021-06-29 RX ORDER — VERAPAMIL HYDROCHLORIDE 240 MG/1
TABLET, FILM COATED, EXTENDED RELEASE ORAL
Qty: 135 TABLET | Refills: 3 | Status: SHIPPED | OUTPATIENT
Start: 2021-06-29 | End: 2021-09-13

## 2021-06-29 RX ORDER — RIVAROXABAN 20 MG/1
TABLET, FILM COATED ORAL
Qty: 90 TABLET | Refills: 3 | Status: SHIPPED | OUTPATIENT
Start: 2021-06-29 | End: 2021-09-13 | Stop reason: SDUPTHER

## 2021-09-13 ENCOUNTER — OFFICE VISIT (OUTPATIENT)
Dept: CARDIOLOGY | Facility: CLINIC | Age: 61
End: 2021-09-13

## 2021-09-13 VITALS
BODY MASS INDEX: 23.99 KG/M2 | SYSTOLIC BLOOD PRESSURE: 118 MMHG | HEART RATE: 56 BPM | WEIGHT: 181 LBS | OXYGEN SATURATION: 99 % | DIASTOLIC BLOOD PRESSURE: 78 MMHG | HEIGHT: 73 IN

## 2021-09-13 DIAGNOSIS — Z82.41 FAMILY HISTORY OF SUDDEN CARDIAC DEATH IN BROTHER: ICD-10-CM

## 2021-09-13 DIAGNOSIS — I48.0 PAROXYSMAL ATRIAL FIBRILLATION (HCC): ICD-10-CM

## 2021-09-13 DIAGNOSIS — I42.1 HYPERTROPHIC OBSTRUCTIVE CARDIOMYOPATHY (HCC): Primary | ICD-10-CM

## 2021-09-13 PROBLEM — Z86.0100 HISTORY OF COLON POLYPS: Status: ACTIVE | Noted: 2019-02-22

## 2021-09-13 PROBLEM — R79.89 ELEVATED TSH: Status: ACTIVE | Noted: 2021-09-13

## 2021-09-13 PROBLEM — R97.20 ELEVATED PSA: Status: ACTIVE | Noted: 2019-02-22

## 2021-09-13 PROBLEM — Z12.11 SCREEN FOR COLON CANCER: Status: ACTIVE | Noted: 2020-02-26

## 2021-09-13 PROBLEM — K12.0 APHTHOUS ULCER OF MOUTH: Status: ACTIVE | Noted: 2017-11-08

## 2021-09-13 PROBLEM — Z86.010 HISTORY OF COLON POLYPS: Status: ACTIVE | Noted: 2019-02-22

## 2021-09-13 PROCEDURE — 93000 ELECTROCARDIOGRAM COMPLETE: CPT | Performed by: NURSE PRACTITIONER

## 2021-09-13 PROCEDURE — 99213 OFFICE O/P EST LOW 20 MIN: CPT | Performed by: NURSE PRACTITIONER

## 2021-09-13 RX ORDER — VERAPAMIL HYDROCHLORIDE 240 MG/1
240 TABLET, FILM COATED, EXTENDED RELEASE ORAL DAILY
Qty: 90 TABLET | Refills: 1 | Status: SHIPPED | OUTPATIENT
Start: 2021-09-13 | End: 2022-03-21 | Stop reason: SDUPTHER

## 2021-09-13 NOTE — PROGRESS NOTES
Date of Office Visit: 21  Encounter Provider: SAM Stephenson  Primary Cardiologist: Dr. Villasenor  Place of Service: [facility]  Patient Name: Dom Romo  :1960      Subjective:     Chief Complaint:  Follow-up HOCM, atrial fibrillation    History of Present Illness:  Dom Romo is a pleasant 61 y.o. male who is new to me .  Outside records have been requested and reviewed by me if available.     This is a patient of Dr. Villasenor with history of hypertrophic obstructive cardiomyopathy status post alcohol septal ablation many years ago, atrial fibrillation.  Apparently had a brother that had sudden death related to HOCM but he himself had never had any sudden cardiac death.    In  his Apple Watch alerted him to episodes of atrial fibrillation and he had an EKG showing atrial fibrillation.  He was started on Xarelto and verapamil for rate control.  2020 patient had an echo that showed his EF was 51% with normal LV cavity size and wall thickness, trace aortic regurgitation, systolic anterior motion of the anterior mitral leaflet without a gradient and no mitral regurgitation or stenosis noted, mild TR with normal RVSP 34 mmHg he had hypokinetic basal anteroseptal, basal inferoseptal and mid anteroseptal.  2020 ZIO monitor showed 100% AF burden with average heart rate of 102 and fastest heart rate 190 bpm, 5 episodes of nonsustained VT longest lasting 15 beats      3/29/2021 patient last saw Dr. Villasenor in the office.  He had overall been doing well up until the day prior when he was doing yard work and had several episodes of brief dizziness no other associated symptoms.  He was not really drinking water throughout the day and was having several cups of tea and usually skips lunch.  His orthostatics were abnormal dropping to 118/80 from 140/90.  Apparently his apple watch was frequently alerting him to A. fib.  In the office he was in A. fib but the  water is not picking it up.  He was walking 4-5 times a week for exercise.  He was noted to have a right bundle branch block with low voltage in ST-T wave abnormalities.  Dr. Villasenor suspected was most likely due to dehydration.  He was advised to increase his fluid intake and if there is no improvement would have him wear a 24-hour Holter to assess his average heart rate.    Patient is presenting today for 6-month follow-up.  He is overall feeling well.  He denies any chest pain, shortness of breath, palpitations, dizziness, lightheadedness, syncope, near syncope, headaches, vision changes, bleeding issues or falls.  He walks typically 1 to 2 miles a few days a week without limiting symptoms and has had no changes activity tolerance.  He has had some lower extremity edema which has been present since he started verapamil.  He tells me his blood pressure runs similar to readings we got today and heart rate he believes he recalls in the 60s.  He is no longer monitoring for A. fib on his apple watch but is not feeling any rapid or irregular heartbeat.  He has some back pain and was concerned about his kidney function given his swelling.  His June 2021 renal function was normal.  He is going to follow-up with his PCP with this.    Past Medical History:   Diagnosis Date   • Cardiomyopathy (CMS/HCC)    • Depression    • HOCM (hypertrophic obstructive cardiomyopathy) (CMS/HCC)      Past Surgical History:   Procedure Laterality Date   • CARDIAC ABLATION       Outpatient Medications Prior to Visit   Medication Sig Dispense Refill   • verapamil SR (CALAN-SR) 240 MG CR tablet TAKE ONE AND ONE-HALF TABLETS DAILY 135 tablet 3   • Xarelto 20 MG tablet TAKE 1 TABLET DAILY 90 tablet 3     No facility-administered medications prior to visit.       Allergies as of 09/13/2021   • (No Known Allergies)     Social History     Socioeconomic History   • Marital status: Single     Spouse name: Not on file   • Number of children: Not on file  "  • Years of education: Not on file   • Highest education level: Not on file   Tobacco Use   • Smoking status: Never Smoker   • Smokeless tobacco: Never Used   • Tobacco comment: caffeine use   Substance and Sexual Activity   • Alcohol use: Yes     Alcohol/week: 1.0 standard drinks     Types: 1 Glasses of wine per week     Comment: rare   • Drug use: No   • Sexual activity: Defer     Family History   Problem Relation Age of Onset   • Hypertension Other    • Stroke Other    • Hypotension Mother    • No Known Problems Father    • No Known Problems Maternal Grandmother    • No Known Problems Maternal Grandfather    • No Known Problems Paternal Grandmother    • No Known Problems Paternal Grandfather      Review of Systems   Constitutional: Negative for chills, fever, malaise/fatigue, weight gain and weight loss.   HENT: Negative for nosebleeds.    Eyes: Negative for visual disturbance.   Cardiovascular: Positive for leg swelling. Negative for chest pain, dyspnea on exertion, irregular heartbeat, near-syncope, orthopnea, palpitations, paroxysmal nocturnal dyspnea and syncope.   Respiratory: Negative for cough, hemoptysis, shortness of breath, snoring and wheezing.    Hematologic/Lymphatic: Negative for bleeding problem. Does not bruise/bleed easily.   Skin: Negative for color change.   Musculoskeletal: Positive for back pain and joint pain. Negative for falls and myalgias.   Gastrointestinal: Negative for diarrhea, hematochezia, melena, nausea and vomiting.   Genitourinary: Negative for hematuria.   Neurological: Negative for excessive daytime sleepiness, dizziness, headaches and light-headedness.   Psychiatric/Behavioral: Negative for depression. The patient is not nervous/anxious.           Objective:     Vitals:    09/13/21 1255 09/13/21 1313   BP: 118/78    Pulse: 55 56   SpO2:  99%   Weight: 82.1 kg (181 lb)    Height: 185.4 cm (73\")      Body mass index is 23.88 kg/m².    PHYSICAL EXAM:  Vitals and nursing note " reviewed.   Constitutional:       General: Not in acute distress.     Appearance: Well-developed and not in distress. Not diaphoretic.   Eyes:      Comments: Wearing glasses   HENT:      Head: Normocephalic and atraumatic.   Neck:      Vascular: No carotid bruit or JVD.   Pulmonary:      Effort: Pulmonary effort is normal. No respiratory distress.      Breath sounds: Normal breath sounds. No wheezing. No rhonchi. No rales.   Cardiovascular:      Bradycardia present. Mild bradycardia Regular rhythm.      Murmurs: There is no murmur.   Pulses:     Intact distal pulses.   Edema:     Peripheral edema present.     Pretibial: bilateral 1+ edema of the pretibial area.     Ankle: bilateral 1+ edema of the ankle.     Feet: bilateral 1+ edema of the feet.  Abdominal:      General: Bowel sounds are normal. There is no distension.      Palpations: Abdomen is soft.      Tenderness: There is no abdominal tenderness.   Skin:     General: Skin is warm and dry.      Findings: No erythema.   Neurological:      Mental Status: Alert and oriented to person, place, and time.   Psychiatric:         Attention and Perception: Attention normal.         Speech: Speech normal.         Behavior: Behavior normal.         Thought Content: Thought content normal.         Cognition and Memory: Cognition normal.         Judgment: Judgment normal.           ECG 12 Lead    Date/Time: 9/13/2021 12:54 PM  Performed by: Alina Glass APRN  Authorized by: Alina Glass APRN   Comparison: compared with previous ECG from 3/29/2021  Rhythm: sinus bradycardia  Rate: bradycardic  BPM: 55  Conduction: right bundle branch block and 1st degree AV block  Other findings: non-specific ST-T wave changes  Comments: Indication: History of atrial fibrillation, HOCM        Reviewed current and prior ECG with Dr. Villasenor    Most recent lab review:  6/7/2021 CMP normal, hemoglobin A1c normal 5.6%, free T4 normal, free T3 3 normal, TSH slightly elevated at 5.420  antithyroglobulin antibody within range and thyroid peroxidase antibody within range  Assessment:       Diagnosis Plan   1. Hypertrophic obstructive cardiomyopathy (CMS/HCC)     2. Paroxysmal atrial fibrillation (CMS/HCC)     3. Family history of sudden cardiac death in brother         Plan:     1. HOCM: s/p alcohol septal ablation in the past.  Brother had sudden death. Repeat echo 7/2020 showed no gradient, EF normal.  He does not have a murmur with Valsalva today on exam.  He is asymptomatic from a cardiac standpoint.  2. AF: He is on verapamil for rate control and apixaban for anticoagulation.  Bleeding and fall safety precautions reviewed.  I reviewed ECG with Dr. Villasenor.  His rhythm is regular.  Difficult to say if he is in sinus due to what appears to be flat T waves.  Possible first-degree AV block.  He denies palpitations.  Decrease verapamil as below for lower extremity edema  3.  Dizziness: Denies at this time.  BP is stable  4.  Bilateral lower extremity edema: Started when verapamil was increased.  No other heart failure symptoms.  We will decrease verapamil back to 240 mg daily.  Blood pressure and heart rate are both stable today he will continue monitoring and let us know with rapid heart rate, palpitations or elevated blood pressure with decreasing verapamil    Otherwise he is stable and doing well.  No further testing needed at this time unless patient develops any symptoms.    Follow up with Dr. Villasenor in 6 months, unless otherwise needed sooner.  I advised the patient to contact our office with any questions or concerns.      I have reviewed this case with Dr. Villasenor. It has been a pleasure to participate in this patient's care. Please feel free to contact me with any questions or concerns.     Alina Glass, SAM  09/13/21             Your medication list          Accurate as of September 13, 2021  1:14 PM. If you have any questions, ask your nurse or doctor.            CONTINUE taking these  medications      Instructions Last Dose Given Next Dose Due   verapamil  MG CR tablet  Commonly known as: CALAN-SR      TAKE ONE AND ONE-HALF TABLETS DAILY       Xarelto 20 MG tablet  Generic drug: rivaroxaban      TAKE 1 TABLET DAILY              The above medication changes may not have been made by this provider.  Medication list was updated to reflect medications patient is currently taking including medication changes and discontinuations made by other healthcare providers or the patients themselves.     Dictated utilizing Dragon Dictation System.

## 2022-03-21 ENCOUNTER — OFFICE VISIT (OUTPATIENT)
Dept: CARDIOLOGY | Facility: CLINIC | Age: 62
End: 2022-03-21

## 2022-03-21 VITALS
BODY MASS INDEX: 23.99 KG/M2 | HEART RATE: 101 BPM | SYSTOLIC BLOOD PRESSURE: 128 MMHG | WEIGHT: 181 LBS | HEIGHT: 73 IN | DIASTOLIC BLOOD PRESSURE: 86 MMHG

## 2022-03-21 DIAGNOSIS — I42.1 HYPERTROPHIC OBSTRUCTIVE CARDIOMYOPATHY: Primary | ICD-10-CM

## 2022-03-21 PROCEDURE — 99213 OFFICE O/P EST LOW 20 MIN: CPT | Performed by: INTERNAL MEDICINE

## 2022-03-21 RX ORDER — VERAPAMIL HYDROCHLORIDE 240 MG/1
240 TABLET, FILM COATED, EXTENDED RELEASE ORAL DAILY
Qty: 90 TABLET | Refills: 3 | Status: SHIPPED | OUTPATIENT
Start: 2022-03-21 | End: 2023-03-13 | Stop reason: SDUPTHER

## 2022-03-21 NOTE — PROGRESS NOTES
Subjective:     Encounter Date: 22      Patient ID: Dom Romo is a 62 y.o. male.    Chief Complaint: Hypertrophic cardiomyopathy    HPI:   This is a 62-year-old man who has a history of hypertrophic obstructive cardiomyopathy and AF.  He himself has never had a sudden death.  He did undergo alcohol septal ablation many years ago.  His brother did have sudden death related to HOCM.    In  his Apple Watch alerted him to episodes of AF. He had an EKG showing AF. He was started on Xarelto and Verapamil for rate control. He had an echo which showed no residual gradient and normal function. His outpatient monitor showed uncontrolled rates up to 190 bpm.     Today he returns for follow up. His son  suddenly, presumably of arrhythmia in 2021. His autopsy did not show HCM. The pt is obviously grieiving. He has no complaints. His HR is intermittently elevated but he doesn't notice it.     He walks 4-5 times per week for exercise. He likes to hike also.   He does not smoke and drinks alcohol occasionally.    The following portions of the patient's history were reviewed and updated as appropriate: allergies, current medications, past family history, past medical history, past social history, past surgical history and problem list.     REVIEW OF SYSTEMS:   All systems reviewed.  Pertinent positives identified in HPI.  All other systems are negative.    Past Medical History:   Diagnosis Date   • Cardiomyopathy (HCC)    • Depression    • HOCM (hypertrophic obstructive cardiomyopathy) ([facility])        Family History   Problem Relation Age of Onset   • Hypertension Other    • Stroke Other    • Hypotension Mother    • No Known Problems Father    • No Known Problems Maternal Grandmother    • No Known Problems Maternal Grandfather    • No Known Problems Paternal Grandmother    • No Known Problems Paternal Grandfather        Social History     Socioeconomic History   • Marital status: Single   Tobacco Use   •  Smoking status: Never Smoker   • Smokeless tobacco: Never Used   • Tobacco comment: caffeine use   Substance and Sexual Activity   • Alcohol use: Yes     Alcohol/week: 1.0 standard drink     Types: 1 Glasses of wine per week     Comment: rare   • Drug use: No   • Sexual activity: Defer       No Known Allergies    Past Surgical History:   Procedure Laterality Date   • CARDIAC ABLATION         Procedures     Objective:         PHYSICAL EXAM:  GEN: VSS, no distress,   Eyes: normal sclera, normal lids and lashes  HENT: moist mucus membranes,   Respiratory: CTAB, no rales or wheezes  CV: irregular RR, no murmurs, , +2 DP and 2+ carotid pulses b/l  GI: NABS, soft,  Nontender, nondistended  MSK: no edema, no scoliosis or kyphosis  Skin: no rash, warm, dry  Heme/Lymph: no bruising or bleeding  Psych: organized thought, normal behavior and affect  Neuro: Cranial nerves grossly intact, Alert and Oriented x 3.         Assessment:          Diagnosis Plan   1. Hypertrophic obstructive cardiomyopathy (HCC)            Plan:       1. HOCM: s/p alcohol septal ablation. RF for sudden death include his brother's sudden death. Repeat echo shows no gradient.   2. AF: Asymptomatic.  Verapamil 240 and Xarelto for anticoagulation.He had edema on higher doses of verapamil. HR overall is well controlled.     Dr. Omer,  thank you very much for referring this kind patient to me. Please call me with any questions or concerns. I will see the patient again in the office in 6 months.       Krys Villasenor MD  03/21/22  [facility]    Outpatient Encounter Medications as of 3/21/2022   Medication Sig Dispense Refill   • rivaroxaban (Xarelto) 20 MG tablet Take 1 tablet by mouth Daily. 90 tablet 1   • verapamil SR (CALAN-SR) 240 MG CR tablet Take 1 tablet by mouth Daily. 90 tablet 1     No facility-administered encounter medications on file as of 3/21/2022.

## 2022-10-07 ENCOUNTER — OFFICE VISIT (OUTPATIENT)
Dept: CARDIOLOGY | Facility: CLINIC | Age: 62
End: 2022-10-07

## 2022-10-07 VITALS
HEART RATE: 70 BPM | BODY MASS INDEX: 23.06 KG/M2 | DIASTOLIC BLOOD PRESSURE: 82 MMHG | WEIGHT: 174 LBS | SYSTOLIC BLOOD PRESSURE: 118 MMHG | HEIGHT: 73 IN

## 2022-10-07 DIAGNOSIS — Z91.89 CHRONIC CHEST PAIN WITH LOW TO MODERATE RISK FOR CAD: Primary | ICD-10-CM

## 2022-10-07 DIAGNOSIS — G89.29 CHRONIC CHEST PAIN WITH LOW TO MODERATE RISK FOR CAD: Primary | ICD-10-CM

## 2022-10-07 DIAGNOSIS — R07.9 CHRONIC CHEST PAIN WITH LOW TO MODERATE RISK FOR CAD: Primary | ICD-10-CM

## 2022-10-07 PROCEDURE — 99214 OFFICE O/P EST MOD 30 MIN: CPT | Performed by: INTERNAL MEDICINE

## 2022-10-07 PROCEDURE — 93000 ELECTROCARDIOGRAM COMPLETE: CPT | Performed by: INTERNAL MEDICINE

## 2022-10-07 NOTE — PROGRESS NOTES
Subjective:   Encounter Date: 10/07/22      Patient ID: Dom Romo is a 62 y.o. male.    Chief Complaint: Hypertrophic cardiomyopathy, AF    HPI:   This is a 62-year-old man who has a history of hypertrophic obstructive cardiomyopathy and AF.  He himself has never had a sudden death.  He did undergo alcohol septal ablation .  His brother did have sudden death related to HOCM.    In  his Apple Watch alerted him to episodes of AF. He had an EKG showing AF. He was started on Xarelto and Verapamil for rate control. He had an echo which showed no residual gradient and normal function. His outpatient monitor showed uncontrolled rates up to 190 bpm.     His son  suddenly, presumably of arrhythmia in 2021. His autopsy did not show HCM.     He returns today.  Overall he feels well however he notices he has had at that 1 episode per month of chest tightness.  Can occur with rest or exertion.  Usually associated with dizziness.  No syncope.  No palpitations.  No shortness of breath.  He walks for exercise.  No recent ischemic evaluation.  At the time of his alcohol septal ablation he was told he did have some coronary disease    He likes to boat and fish. He walks 4-5 times per week for exercise. He likes to hike also.   He does not smoke and drinks alcohol occasionally.    The following portions of the patient's history were reviewed and updated as appropriate: allergies, current medications, past family history, past medical history, past social history, past surgical history and problem list.     REVIEW OF SYSTEMS:   All systems reviewed.  Pertinent positives identified in HPI.  All other systems are negative.    Past Medical History:   Diagnosis Date   • Cardiomyopathy (HCC)    • Depression    • HOCM (hypertrophic obstructive cardiomyopathy) ([facility])        Family History   Problem Relation Age of Onset   • Hypertension Other    • Stroke Other    • Hypotension Mother    • No Known Problems Father     • No Known Problems Maternal Grandmother    • No Known Problems Maternal Grandfather    • No Known Problems Paternal Grandmother    • No Known Problems Paternal Grandfather        Social History     Socioeconomic History   • Marital status: Single   Tobacco Use   • Smoking status: Never Smoker   • Smokeless tobacco: Never Used   • Tobacco comment: caffeine use   Vaping Use   • Vaping Use: Never used   Substance and Sexual Activity   • Alcohol use: Yes     Alcohol/week: 1.0 standard drink     Types: 1 Glasses of wine per week     Comment: rare   • Drug use: No   • Sexual activity: Defer       No Known Allergies    Past Surgical History:   Procedure Laterality Date   • CARDIAC ABLATION           ECG 12 Lead    Date/Time: 10/7/2022 11:22 AM  Performed by: Krys Villasenor MD  Authorized by: Krys Villasenor MD   Comparison: compared with previous ECG   Rhythm: atrial fibrillation  Rate: normal  Conduction: right bundle branch block  ST Segments: ST segments normal  T Waves: T waves normal  QRS axis: normal  Other findings: non-specific ST-T wave changes    Clinical impression: abnormal EKG             Objective:         PHYSICAL EXAM:  GEN: VSS, no distress,   Eyes: normal sclera, normal lids and lashes  HENT: moist mucus membranes,   Respiratory: CTAB, no rales or wheezes  CV: irregular RR, no murmurs, , +2 DP and 2+ carotid pulses b/l  GI: NABS, soft,  Nontender, nondistended  MSK: no edema, no scoliosis or kyphosis  Skin: no rash, warm, dry  Heme/Lymph: no bruising or bleeding  Psych: organized thought, normal behavior and affect  Neuro: Cranial nerves grossly intact, Alert and Oriented x 3.         Assessment:          Diagnosis Plan   1. Chronic chest pain with low to moderate risk for CAD  Stress Test With Myocardial Perfusion One Day          Plan:       1. HOCM: s/p alcohol septal ablation.  No residual gradient.  RF for sudden death include his brother's sudden death.  2. AF: Asymptomatic.  Verapamil 240 and  Xarelto for anticoagulation.He had edema on higher doses of verapamil. HR overall is well controlled.   3.  Chest pain: Unclear whether this is reflux as it occasionally occurs on an empty stomach, or cardiac pain.  We will start with a nuclear stress test.  May be related to HCM, follow-up results.    Dr. Omer,  thank you very much for referring this kind patient to me. Please call me with any questions or concerns. I will see the patient again in the office in 6 months.     Krys Villasenor MD  10/07/22  [facility]    Outpatient Encounter Medications as of 10/7/2022   Medication Sig Dispense Refill   • rivaroxaban (Xarelto) 20 MG tablet Take 1 tablet by mouth Daily. 90 tablet 3   • verapamil SR (CALAN-SR) 240 MG CR tablet Take 1 tablet by mouth Daily. 90 tablet 3     No facility-administered encounter medications on file as of 10/7/2022.

## 2022-10-14 ENCOUNTER — HOSPITAL ENCOUNTER (OUTPATIENT)
Dept: CARDIOLOGY | Facility: HOSPITAL | Age: 62
Discharge: HOME | End: 2022-10-14
Admitting: INTERNAL MEDICINE

## 2022-10-14 VITALS — HEIGHT: 73 IN | WEIGHT: 174.16 LBS | BODY MASS INDEX: 23.08 KG/M2

## 2022-10-14 DIAGNOSIS — G89.29 CHRONIC CHEST PAIN WITH LOW TO MODERATE RISK FOR CAD: ICD-10-CM

## 2022-10-14 DIAGNOSIS — Z91.89 CHRONIC CHEST PAIN WITH LOW TO MODERATE RISK FOR CAD: ICD-10-CM

## 2022-10-14 DIAGNOSIS — R07.9 CHRONIC CHEST PAIN WITH LOW TO MODERATE RISK FOR CAD: ICD-10-CM

## 2022-10-14 LAB
BH CV NUCLEAR PRIOR STUDY: 2
BH CV REST NUCLEAR ISOTOPE DOSE: 10.7 MCI
BH CV STRESS BP STAGE 1: NORMAL
BH CV STRESS COMMENTS STAGE 1: NORMAL
BH CV STRESS DOSE REGADENOSON STAGE 1: 0.4
BH CV STRESS DURATION MIN STAGE 1: 0
BH CV STRESS DURATION SEC STAGE 1: 10
BH CV STRESS HR STAGE 1: 99
BH CV STRESS NUCLEAR ISOTOPE DOSE: 34.8 MCI
BH CV STRESS PROTOCOL 1: NORMAL
BH CV STRESS RECOVERY BP: NORMAL MMHG
BH CV STRESS RECOVERY HR: 88 BPM
BH CV STRESS STAGE 1: 1
LV EF NUC BP: 42 %
MAXIMAL PREDICTED HEART RATE: 158 BPM
PERCENT MAX PREDICTED HR: 62.66 %
STRESS BASELINE BP: NORMAL MMHG
STRESS BASELINE HR: 78 BPM
STRESS PERCENT HR: 74 %
STRESS POST EXERCISE DUR SEC: 10 SEC
STRESS POST PEAK BP: NORMAL MMHG
STRESS POST PEAK HR: 99 BPM
STRESS TARGET HR: 134 BPM

## 2022-10-14 PROCEDURE — A9502 TC99M TETROFOSMIN: HCPCS | Performed by: INTERNAL MEDICINE

## 2022-10-14 PROCEDURE — 93017 CV STRESS TEST TRACING ONLY: CPT

## 2022-10-14 PROCEDURE — 93016 CV STRESS TEST SUPVJ ONLY: CPT | Performed by: INTERNAL MEDICINE

## 2022-10-14 PROCEDURE — 93018 CV STRESS TEST I&R ONLY: CPT | Performed by: INTERNAL MEDICINE

## 2022-10-14 PROCEDURE — 78452 HT MUSCLE IMAGE SPECT MULT: CPT

## 2022-10-14 PROCEDURE — 78452 HT MUSCLE IMAGE SPECT MULT: CPT | Performed by: INTERNAL MEDICINE

## 2022-10-14 PROCEDURE — 0 TECHNETIUM TETROFOSMIN KIT: Performed by: INTERNAL MEDICINE

## 2022-10-14 PROCEDURE — 25010000002 REGADENOSON 0.4 MG/5ML SOLUTION: Performed by: INTERNAL MEDICINE

## 2022-10-14 RX ADMIN — REGADENOSON 0.4 MG: 0.08 INJECTION, SOLUTION INTRAVENOUS at 08:14

## 2022-10-14 RX ADMIN — TETROFOSMIN 1 DOSE: 1.38 INJECTION, POWDER, LYOPHILIZED, FOR SOLUTION INTRAVENOUS at 07:38

## 2022-10-14 RX ADMIN — TETROFOSMIN 1 DOSE: 1.38 INJECTION, POWDER, LYOPHILIZED, FOR SOLUTION INTRAVENOUS at 08:13

## 2023-01-25 ENCOUNTER — TELEPHONE (OUTPATIENT)
Dept: CARDIOLOGY | Facility: CLINIC | Age: 63
End: 2023-01-25
Payer: COMMERCIAL

## 2023-01-25 NOTE — TELEPHONE ENCOUNTER
I called pt, explained to him that he needs to stop the Xarelto for now and contact his PCP for further urological workup.    river milton  1/25/23

## 2023-01-25 NOTE — TELEPHONE ENCOUNTER
Pt called and lvm. He is having a lot of hematuria x 3 days, and is asking if it could be related to his Xaretlo?    Please advise. [phone]    Thank you,    RADHIKA Frye

## 2023-03-13 NOTE — TELEPHONE ENCOUNTER
rec'd fax to refill Verapamil 240 and Xarelto 20  Qty of 90  [facility]    river milton  3/13/23

## 2023-03-14 RX ORDER — VERAPAMIL HYDROCHLORIDE 240 MG/1
240 TABLET, FILM COATED, EXTENDED RELEASE ORAL DAILY
Qty: 90 TABLET | Refills: 3 | Status: SHIPPED | OUTPATIENT
Start: 2023-03-14

## 2023-04-07 ENCOUNTER — OFFICE VISIT (OUTPATIENT)
Dept: CARDIOLOGY | Facility: CLINIC | Age: 63
End: 2023-04-07
Payer: COMMERCIAL

## 2023-04-07 VITALS
HEIGHT: 73 IN | HEART RATE: 62 BPM | SYSTOLIC BLOOD PRESSURE: 138 MMHG | BODY MASS INDEX: 23.46 KG/M2 | WEIGHT: 177 LBS | DIASTOLIC BLOOD PRESSURE: 96 MMHG

## 2023-04-07 DIAGNOSIS — I10 ESSENTIAL HYPERTENSION: Primary | ICD-10-CM

## 2023-04-07 PROCEDURE — 99214 OFFICE O/P EST MOD 30 MIN: CPT | Performed by: INTERNAL MEDICINE

## 2023-04-07 RX ORDER — SPIRONOLACTONE 25 MG/1
25 TABLET ORAL DAILY
Qty: 90 TABLET | Refills: 3 | Status: SHIPPED | OUTPATIENT
Start: 2023-04-07 | End: 2023-04-18

## 2023-04-07 NOTE — PROGRESS NOTES
Subjective:   Encounter Date: 23      Patient ID: Dom Romo is a 63 y.o. male.    Chief Complaint: Hypertrophic cardiomyopathy, AF    HPI:   This is a 63-year-old man who has a history of hypertrophic obstructive cardiomyopathy and AF.  He himself has never had a sudden death.  He did undergo alcohol septal ablation .  His brother did have sudden death related to HOCM.    In  his Apple Watch alerted him to episodes of AF. He had an EKG showing AF. He was started on Xarelto and Verapamil for rate control. He had an echo which showed no residual gradient and normal function. His outpatient monitor showed uncontrolled rates up to 190 bpm.     His son  suddenly, presumably of arrhythmia in 2021. His autopsy did not show HCM.     He returns today.  Since he was last here he has had a several issues regarding his anticoagulant.  He had some hematuria associated with a kidney stone so Xarelto was held.  In that time he quickly developed what appears to be a embolus to his kidney.  [place]  He is now back on his Xarelto.  He has noticed that his diastolic pressures have been elevated and he has some mild lower extremity edema.  No dyspnea.      He likes to boat and fish. He walks 4-5 times per week for exercise. He likes to hike also.   He does not smoke and drinks alcohol occasionally.    The following portions of the patient's history were reviewed and updated as appropriate: allergies, current medications, past family history, past medical history, past social history, past surgical history and problem list.     REVIEW OF SYSTEMS:   All systems reviewed.  Pertinent positives identified in HPI.  All other systems are negative.    Past Medical History:   Diagnosis Date   • Cardiomyopathy    • Depression    • HOCM (hypertrophic obstructive cardiomyopathy)        Family History   Problem Relation Age of Onset   • Hypertension Other    • Stroke Other    •  Hypotension Mother    • No Known Problems Father    • No Known Problems Maternal Grandmother    • No Known Problems Maternal Grandfather    • No Known Problems Paternal Grandmother    • No Known Problems Paternal Grandfather        Social History     Socioeconomic History   • Marital status: Single   Tobacco Use   • Smoking status: Never   • Smokeless tobacco: Never   • Tobacco comments:     caffeine use   Vaping Use   • Vaping Use: Never used   Substance and Sexual Activity   • Alcohol use: Yes     Alcohol/week: 1.0 standard drink     Types: 1 Glasses of wine per week     Comment: rare   • Drug use: No   • Sexual activity: Defer       No Known Allergies    Past Surgical History:   Procedure Laterality Date   • CARDIAC ABLATION         Procedures     Objective:         PHYSICAL EXAM:  GEN: VSS, no distress,   Eyes: normal sclera, normal lids and lashes  HENT: moist mucus membranes,   Respiratory: CTAB, no rales or wheezes  CV: irregular RR, no murmurs, , +2 DP and 2+ carotid pulses b/l  GI: NABS, soft,  Nontender, nondistended  MSK: no edema, no scoliosis or kyphosis  Skin: no rash, warm, dry  Heme/Lymph: no bruising or bleeding  Psych: organized thought, normal behavior and affect  Neuro: Cranial nerves grossly intact, Alert and Oriented x 3.         Assessment:          Diagnosis Plan   1. Essential hypertension  Basic Metabolic Panel             Plan:       1. HOCM: s/p alcohol septal ablation.  No residual gradient.  RF for sudden death include his brother's sudden death.  Start spironolactone for elevated diastolic pressures and lower extremity edema  2. AF: Asymptomatic.  Verapamil 240 and Xarelto for anticoagulation.  3.  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  Discussed Watchman device if bleeding is ongoing on anticoagulation.    Dr. Omer,  thank you very much for referring this kind patient to me. Please call me with any questions or concerns. I will see the patient again in the office in 6  months.     Krys Villasenor MD  04/07/23  [facility]    Outpatient Encounter Medications as of 4/7/2023   Medication Sig Dispense Refill   • rivaroxaban (Xarelto) 20 MG tablet Take 1 tablet by mouth Daily. 90 tablet 3   • verapamil SR (CALAN-SR) 240 MG CR tablet Take 1 tablet by mouth Daily. 90 tablet 3     No facility-administered encounter medications on file as of 4/7/2023.

## 2023-04-13 ENCOUNTER — TELEPHONE (OUTPATIENT)
Dept: CARDIOLOGY | Facility: CLINIC | Age: 63
End: 2023-04-13
Payer: COMMERCIAL

## 2023-04-13 NOTE — TELEPHONE ENCOUNTER
He told me his blood pressures had been high at home, his diastolic BP was high at the visit, and he said his legs had been swollen.

## 2023-04-13 NOTE — TELEPHONE ENCOUNTER
Pt called and lvm due to concerns with Spironolactone.   He saw his Nephrologist and they do not want pt taking it due to his kidney function. Mr. Romo said that Nephrologist didn't know why he needed to take Spironolactone, because his BP was fine at his visit.    Please advise.    Here's the last plan.    Plan       1. HOCM: s/p alcohol septal ablation.  No residual gradient.  RF for sudden death include his brother's sudden death.  Start spironolactone for elevated diastolic pressures and lower extremity edema  2. AF: Asymptomatic.  Verapamil 240 and Xarelto for anticoagulation.  3.  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  Discussed Watchman device if bleeding is ongoing on anticoagulation.     Dr. Omer,  thank you very much for referring this kind patient to me. Please call me with any questions or concerns. I will see the patient again in the office in 6 months.        Krys Villasenor MD  04/07/23    [facility]

## 2023-04-14 NOTE — TELEPHONE ENCOUNTER
I called pt and told him reason he was put on Spironolactone was due to high bp's.  He said he's having issues again, and is asking if there is an alternative to the Spironolactone, that won't affect his kidney function?    His BP last night was 143/90, and this morning 125/85.  He said the edema isn't any better than it was at his last visit.    Please advise.    Thank you,    [place], RADHIKA

## 2023-04-18 RX ORDER — CARVEDILOL 25 MG/1
25 TABLET ORAL 2 TIMES DAILY
Qty: 60 TABLET | Refills: 0 | Status: SHIPPED | OUTPATIENT
Start: 2023-04-18

## 2023-04-18 NOTE — TELEPHONE ENCOUNTER
Dr. Villasenor,    Did you get a chance to review info from conversation I had with pt on 4/14/23?    Thank you,    RADHIKA Frye

## 2023-05-03 ENCOUNTER — TELEPHONE (OUTPATIENT)
Dept: CARDIOLOGY | Facility: CLINIC | Age: 63
End: 2023-05-03
Payer: COMMERCIAL

## 2023-05-03 RX ORDER — CARVEDILOL 25 MG/1
25 TABLET ORAL 2 TIMES DAILY
Qty: 180 TABLET | Refills: 3 | Status: SHIPPED | OUTPATIENT
Start: 2023-05-03

## 2023-05-03 NOTE — TELEPHONE ENCOUNTER
Pt called and lvm. He wanted to let you know that his BP is doing well since the change to Carvedilol.  Do you want him to stay on the Carvedilol? If so, he is requesting add'l refills to be sent to [facility].  I have already attached the refill to this message.    Thank you,    RADHIKA Frye

## 2023-05-08 RX ORDER — CARVEDILOL 25 MG/1
25 TABLET ORAL 2 TIMES DAILY
Qty: 180 TABLET | Refills: 3 | Status: CANCELLED | OUTPATIENT
Start: 2023-05-08

## 2023-10-10 ENCOUNTER — OFFICE VISIT (OUTPATIENT)
Dept: CARDIOLOGY | Facility: CLINIC | Age: 63
End: 2023-10-10
Payer: COMMERCIAL

## 2023-10-10 VITALS
HEART RATE: 72 BPM | DIASTOLIC BLOOD PRESSURE: 82 MMHG | BODY MASS INDEX: 23.7 KG/M2 | WEIGHT: 175 LBS | HEIGHT: 72 IN | OXYGEN SATURATION: 100 % | SYSTOLIC BLOOD PRESSURE: 122 MMHG

## 2023-10-10 DIAGNOSIS — I48.0 PAROXYSMAL ATRIAL FIBRILLATION: Primary | ICD-10-CM

## 2023-10-10 DIAGNOSIS — I42.1 HYPERTROPHIC OBSTRUCTIVE CARDIOMYOPATHY: ICD-10-CM

## 2023-10-10 DIAGNOSIS — R09.89 ABSENT RADIAL PULSE: ICD-10-CM

## 2023-10-10 PROCEDURE — 99214 OFFICE O/P EST MOD 30 MIN: CPT | Performed by: NURSE PRACTITIONER

## 2023-10-10 PROCEDURE — 93000 ELECTROCARDIOGRAM COMPLETE: CPT | Performed by: NURSE PRACTITIONER

## 2023-10-10 RX ORDER — LOSARTAN POTASSIUM 50 MG/1
50 TABLET ORAL DAILY
COMMUNITY

## 2023-10-10 NOTE — PROGRESS NOTES
Date of Office Visit: 10/10/2023  Encounter Provider: SAM Hernandez  Place of Service: [facility]  Patient Name: Dom Romo  :1960    Chief Complaint   Patient presents with    Follow-up    Atrial Fibrillation   :     HPI: Dom Romo is a 63 y.o. male who is a patient of Dr. Duckworth and is new to me today.  He has a history of hypertrophic obstructive cardiomyopathy and atrial fibrillation.  He had a septal ablation in .  His brother had sudden death from hocm.  In  his Apple Watch alerted him for episodes of A-fib he was put on Xarelto and verapamil for rate control.  He had an echo that showed no residual gradient and normal function outpatient monitor showed his rates were uncontrolled up to 190 bpm.    His son  suddenly in 2021 presumably from an arrhythmia autopsy did not show hocm.  He was last here in the office 6 months ago.  He was having some issues with hematuria and his anticoagulation had to be held.  He developed what appeared to be an embolus to his kidney and was hospitalized at [facility].  His renal function is still recovering from this.  He denies any chest pain, pressure or tightness.    In October of last year he underwent a stress test that was negative for ischemia.  There was some hypokinesis of the septal wall EF was 42% RV was enlarged.  It was thought that the EF was calculated low because of his atrial fibrillation.  Previous echo showed normal LV size and wall thickness.  Here for follow-up today.    Overall he is doing well he has not been short of breath he did get put on losartan by the nephrologist due to an increase in his blood pressure.  His renal function has been stable with a creatinine of 1.35 followed by 1.33.  His last lipid panel in August showed an LDL of 117 and an LDL of 39.  He likes to go hiking.  He usually hikes on the weekends to stay active.  Recently when he takes a shower he has  "noticed that his right hand is turning white.  The showers are hot, there is no pain numbness tingling or change in sensation.  Does not happen with cold.  Previous testing and notes have been reviewed by me.   Past Medical History:   Diagnosis Date    Cardiomyopathy     Depression     HOCM (hypertrophic obstructive cardiomyopathy)        Past Surgical History:   Procedure Laterality Date    CARDIAC ABLATION         Social History     Socioeconomic History    Marital status: Single   Tobacco Use    Smoking status: Never    Smokeless tobacco: Never    Tobacco comments:     caffeine use   Vaping Use    Vaping Use: Never used   Substance and Sexual Activity    Alcohol use: Yes     Alcohol/week: 1.0 standard drink of alcohol     Types: 1 Glasses of wine per week     Comment: rare    Drug use: No    Sexual activity: Defer       Family History   Problem Relation Age of Onset    Hypertension Other     Stroke Other     Hypotension Mother     No Known Problems Father     No Known Problems Maternal Grandmother     No Known Problems Maternal Grandfather     No Known Problems Paternal Grandmother     No Known Problems Paternal Grandfather        ROS    No Known Allergies      Current Outpatient Medications:     carvedilol (COREG) 25 MG tablet, Take 1 tablet by mouth 2 (Two) Times a Day., Disp: 180 tablet, Rfl: 3    rivaroxaban (XARELTO) 20 MG tablet, Take 1 tablet by mouth Daily., Disp: 90 tablet, Rfl: 3    losartan (COZAAR) 50 MG tablet, Take 1 tablet by mouth Daily., Disp: , Rfl:       Objective:     Vitals:    10/10/23 1038   BP: 122/82   Pulse: 72   SpO2: 100%   Weight: 79.4 kg (175 lb)   Height: 182.9 cm (72\")     Body mass index is 23.73 kg/mý.    PHYSICAL EXAM:    Constitutional:       General: Not in acute distress.     Appearance: Normal appearance. Well-developed.   Eyes:      Pupils: Pupils are equal, round, and reactive to light.   HENT:      Head: Normocephalic.   Neck:      Vascular: No carotid bruit or JVD. "   Pulmonary:      Effort: Pulmonary effort is normal. No tachypnea.      Breath sounds: Normal breath sounds. No wheezing. No rales.   Cardiovascular:      Normal rate. Regular rhythm.      No gallop.       Comments: Absent right radial pulse.  Gelacio's test reveals pale hand with release of the radial artery.  Hand turns pink with release of the ulnar.  Pulses:     Intact distal pulses.   Edema:     Peripheral edema absent.   Abdominal:      General: Bowel sounds are normal.      Palpations: Abdomen is soft.      Tenderness: There is no abdominal tenderness.   Musculoskeletal: Normal range of motion.      Cervical back: Normal range of motion and neck supple. No edema. Skin:     General: Skin is warm and dry.   Neurological:      Mental Status: Alert and oriented to person, place, and time.           ECG 12 Lead    Date/Time: 10/10/2023 10:52 AM  Performed by: Ashlyn Foster APRN    Authorized by: Ashlyn Foster APRN  Comparison: compared with previous ECG from 10/7/2022  Similar to previous ECG  Rhythm: atrial fibrillation  Ectopy: infrequent PVCs  Rate: normal  Conduction: incomplete right bundle branch block  T inversion: V6, V5 and V4  QRS axis: normal  Other findings: non-specific ST-T wave changes    Clinical impression: abnormal EKG            Assessment:       Diagnosis Plan   1. Paroxysmal atrial fibrillation     Rate controlled.  Remains on Coreg and Xarelto   2. Hypertrophic obstructive cardiomyopathy     Asymptomatic.  Last echo showed normal gradients.  Blood pressure controlled on losartan and Coreg.   3. Absent radial pulse  US Arterial Doppler Upper Extremity Right   Does not appear to have any acute circulatory insufficiency hand is warm and pink and not painful.  However radial pulse is absent.  Discussed with Dr. Mistry we will send for arterial Doppler and if circulatory issue refer to hand.     Orders Placed This Encounter   Procedures    US Arterial Doppler Upper Extremity Right      Standing Status:   Future     Standing Expiration Date:   10/10/2024     Order Specific Question:   Reason for Exam:     Answer:   missing radial pulse     Order Specific Question:   Release to patient     Answer:   Routine Release [[id number]]    ECG 12 Lead     This order was created via procedure documentation     Order Specific Question:   Release to patient     Answer:   Routine Release [[id number]]          Plan:       Follow-up with Dr. Villasenor in 6 months.  I will be in communication with him about his Doppler.         Your medication list            Accurate as of October 10, 2023 11:33 AM. If you have any questions, ask your nurse or doctor.                CONTINUE taking these medications        Instructions Last Dose Given Next Dose Due   carvedilol 25 MG tablet  Commonly known as: COREG      Take 1 tablet by mouth 2 (Two) Times a Day.       losartan 50 MG tablet  Commonly known as: COZAAR      Take 1 tablet by mouth Daily.       rivaroxaban 20 MG tablet  Commonly known as: XARELTO      Take 1 tablet by mouth Daily.                 Where to Get Your Medications        These medications were sent to [facility] - [place], [place] - [address] [place] [address] - [phone] [facility] [phone] [address] [address]      Phone: [phone]   rivaroxaban 20 MG tablet           As always, it has been a pleasure to participate in your patient's care.      Sincerely,     Ashlyn VARGAS

## 2023-10-11 ENCOUNTER — HOSPITAL ENCOUNTER (OUTPATIENT)
Dept: CARDIOLOGY | Facility: HOSPITAL | Age: 63
Discharge: HOME | End: 2023-10-11
Payer: COMMERCIAL

## 2023-10-11 ENCOUNTER — TRANSCRIBE ORDERS (OUTPATIENT)
Dept: ADMINISTRATIVE | Facility: HOSPITAL | Age: 63
End: 2023-10-11
Payer: COMMERCIAL

## 2023-10-11 DIAGNOSIS — R09.89 ABSENT PULSE: ICD-10-CM

## 2023-10-11 DIAGNOSIS — R09.89 ABSENT RADIAL PULSE: ICD-10-CM

## 2023-10-11 DIAGNOSIS — R09.89 ABSENT RADIAL PULSE: Primary | ICD-10-CM

## 2023-10-11 LAB
BH CV UPPER ARTERIAL LEFT 2ND DIGIT SYS MAX: 123
BH CV UPPER ARTERIAL LEFT FBI 2ND DIGIT RATIO: 1.15
BH CV UPPER ARTERIAL LEFT WBI RATIO: 1.12
BH CV UPPER ARTERIAL LEFT WBI RATIO: 1.12
BH CV UPPER ARTERIAL RIGHT 1ST DIGIT SYS MAX: 102
BH CV UPPER ARTERIAL RIGHT 2ND DIGIT SYS MAX: 119
BH CV UPPER ARTERIAL RIGHT 3RD DIGIT SYS MAX: 138
BH CV UPPER ARTERIAL RIGHT 4TH DIGIT SYS MAX: 146
BH CV UPPER ARTERIAL RIGHT 5TH DIGIT SYS MAX: 134
BH CV UPPER ARTERIAL RIGHT FBI 1ST DIGIT RATIO: 0.95
BH CV UPPER ARTERIAL RIGHT FBI 2ND DIGIT RATIO: 1.11
BH CV UPPER ARTERIAL RIGHT FBI 3RD DIGIT RATIO: 1.29
BH CV UPPER ARTERIAL RIGHT FBI 4TH DIGIT RATIO: 1.36
BH CV UPPER ARTERIAL RIGHT FBI 5TH DIGIT RATIO: 1.25
BH CV UPPER ARTERIAL RIGHT WBI RATIO: 1.06
BH CV UPPER ARTERIAL RIGHT WBI RATIO: 1.06
BH CV UPPER DUPLEX RIGHT AXILLARY ART EDV: 0 CM/S
BH CV UPPER DUPLEX RIGHT AXILLARY ARTERY PSV: 79 CM/S
BH CV UPPER DUPLEX RIGHT BRACHIA ART EDV: 0 CM/S
BH CV UPPER DUPLEX RIGHT BRACHIAL ART PSV: 61 CM/S
BH CV UPPER DUPLEX RIGHT RADIAL ART EDV: 0 CM/S
BH CV UPPER DUPLEX RIGHT RADIAL ART PSV: 35 CM/S
BH CV UPPER DUPLEX RIGHT SUBCLAVIAN ART EDV: 0 CM/S
BH CV UPPER DUPLEX RIGHT SUBCLAVIAN ART PSV: 87 CM/S
BH CV UPPER DUPLEX RIGHT ULNAR ART EDV: 0 CM/S
BH CV UPPER DUPLEX RIGHT ULNAR ART PSV: 77 CM/S
UPPER ARTERIAL LEFT ARM BRACHIAL SYS MAX: 100
UPPER ARTERIAL LEFT ARM BRACHIAL SYS MAX: 100
UPPER ARTERIAL LEFT ARM RADIAL SYS MAX: 114
UPPER ARTERIAL LEFT ARM RADIAL SYS MAX: 114
UPPER ARTERIAL LEFT ARM ULNAR SYS MAX: 120
UPPER ARTERIAL LEFT ARM ULNAR SYS MAX: 120
UPPER ARTERIAL RIGHT ARM BRACHIAL SYS MAX: 107
UPPER ARTERIAL RIGHT ARM BRACHIAL SYS MAX: 107
UPPER ARTERIAL RIGHT ARM RADIAL SYS MAX: 96
UPPER ARTERIAL RIGHT ARM RADIAL SYS MAX: 96
UPPER ARTERIAL RIGHT ARM ULNAR SYS MAX: 113
UPPER ARTERIAL RIGHT ARM ULNAR SYS MAX: 113

## 2023-10-11 PROCEDURE — 93922 UPR/L XTREMITY ART 2 LEVELS: CPT

## 2023-10-11 PROCEDURE — 93931 UPPER EXTREMITY STUDY: CPT

## 2024-03-29 RX ORDER — RIVAROXABAN 20 MG/1
20 TABLET, FILM COATED ORAL DAILY
Qty: 90 TABLET | Refills: 3 | Status: SHIPPED | OUTPATIENT
Start: 2024-03-29

## 2024-05-06 ENCOUNTER — OFFICE VISIT (OUTPATIENT)
Age: 64
End: 2024-05-06
Payer: COMMERCIAL

## 2024-05-06 VITALS
HEART RATE: 87 BPM | SYSTOLIC BLOOD PRESSURE: 130 MMHG | WEIGHT: 178 LBS | BODY MASS INDEX: 24.11 KG/M2 | OXYGEN SATURATION: 98 % | DIASTOLIC BLOOD PRESSURE: 80 MMHG | HEIGHT: 72 IN

## 2024-05-06 DIAGNOSIS — I48.0 PAROXYSMAL ATRIAL FIBRILLATION: Primary | ICD-10-CM

## 2024-05-06 PROCEDURE — 99213 OFFICE O/P EST LOW 20 MIN: CPT | Performed by: INTERNAL MEDICINE

## 2024-07-09 RX ORDER — CARVEDILOL 25 MG/1
25 TABLET ORAL 2 TIMES DAILY
Qty: 180 TABLET | Refills: 3 | Status: SHIPPED | OUTPATIENT
Start: 2024-07-09

## 2025-04-08 RX ORDER — CARVEDILOL 25 MG/1
25 TABLET ORAL 2 TIMES DAILY
Qty: 180 TABLET | Refills: 3 | Status: SHIPPED | OUTPATIENT
Start: 2025-04-08

## 2025-05-05 ENCOUNTER — OFFICE VISIT (OUTPATIENT)
Dept: CARDIOLOGY | Age: 65
End: 2025-05-05
Payer: MEDICARE

## 2025-05-05 VITALS
DIASTOLIC BLOOD PRESSURE: 84 MMHG | SYSTOLIC BLOOD PRESSURE: 122 MMHG | OXYGEN SATURATION: 99 % | WEIGHT: 183 LBS | HEIGHT: 72 IN | BODY MASS INDEX: 24.79 KG/M2 | HEART RATE: 61 BPM

## 2025-05-05 DIAGNOSIS — I48.0 PAROXYSMAL ATRIAL FIBRILLATION: Primary | ICD-10-CM

## 2025-05-05 PROCEDURE — 93000 ELECTROCARDIOGRAM COMPLETE: CPT | Performed by: NURSE PRACTITIONER

## 2025-05-05 PROCEDURE — 99214 OFFICE O/P EST MOD 30 MIN: CPT | Performed by: NURSE PRACTITIONER

## 2025-05-05 PROCEDURE — 1159F MED LIST DOCD IN RCRD: CPT | Performed by: NURSE PRACTITIONER

## 2025-05-05 PROCEDURE — 1160F RVW MEDS BY RX/DR IN RCRD: CPT | Performed by: NURSE PRACTITIONER

## 2025-05-05 NOTE — PROGRESS NOTES
Date of Office Visit: 2025  Encounter Provider: SAM Hernandez  Place of Service: [facility] CARDIOLOGY  Patient Name: Dom Romo  :1960    Chief complaint: Hypertrophic cardiomyopathy and atrial fibrillation    HPI: Dom Romo is a 65 y.o. male who is a patient of Dr. Villasenor and is new to me today.  He has a history of hypertrophic obstructive cardiomyopathy and atrial fibrillation.  He had an alcohol septal ablation in .  His brother had a sudden death related to hokum and his son  suddenly though an autopsy did not show hypertrophic cardiomyopathy.    In  his Apple Watch alerted him that he was having episodes of A-fib.  He was started on Xarelto and verapamil for rate control.  He had an echocardiogram done that showed no residual gradient and normal LV function outpatient monitor showed uncontrolled rates up to 190.  He was last in the office in May of last year he had had some hematuria in the past related to a kidneys stone.  His anticoagulation had to be held and it resulted in an embolic phenomenon to the kidney the kidney.  His anticoagulation was resumed and he has not had any further hematuria.  He is pretty active he climbs the stairs at the [employer] for work.  He is active he boats and fishes regularly.  No use of alcohol.  In the future if he needs an procedure he will need a bridge.    Previous testing and notes have been reviewed by me.   Past Medical History:   Diagnosis Date    Cardiomyopathy     Depression     HOCM (hypertrophic obstructive cardiomyopathy)        Past Surgical History:   Procedure Laterality Date    CARDIAC ABLATION         Social History     Socioeconomic History    Marital status: Single   Tobacco Use    Smoking status: Never    Smokeless tobacco: Never    Tobacco comments:     caffeine use   Vaping Use    Vaping status: Never Used   Substance and Sexual Activity    Alcohol use: Yes     Alcohol/week: 1.0  "standard drink of alcohol     Types: 1 Glasses of wine per week     Comment: rare    Drug use: No    Sexual activity: Defer       Family History   Problem Relation Age of Onset    Hypertension Other     Stroke Other     Hypotension Mother     No Known Problems Father     No Known Problems Maternal Grandmother     No Known Problems Maternal Grandfather     No Known Problems Paternal Grandmother     No Known Problems Paternal Grandfather        Review of Systems   Constitutional: Negative for diaphoresis and malaise/fatigue.   Cardiovascular:  Negative for chest pain, claudication, dyspnea on exertion, irregular heartbeat, leg swelling, near-syncope, orthopnea, palpitations, paroxysmal nocturnal dyspnea and syncope.   Respiratory:  Negative for cough, shortness of breath and sleep disturbances due to breathing.    Musculoskeletal:  Negative for falls.   Neurological:  Negative for dizziness and weakness.   Psychiatric/Behavioral:  Negative for altered mental status and substance abuse.        No Known Allergies      Current Outpatient Medications:     carvedilol (COREG) 25 MG tablet, Take 1 tablet by mouth 2 (Two) Times a Day., Disp: 180 tablet, Rfl: 3    losartan (COZAAR) 50 MG tablet, Take 1 tablet by mouth Daily., Disp: , Rfl:     rivaroxaban (Xarelto) 20 MG tablet, Take 1 tablet by mouth Daily., Disp: 90 tablet, Rfl: 1        Objective:     Vitals:    05/05/25 1046   BP: 122/84   Pulse: 61   SpO2: 99%   Weight: 83 kg (183 lb)   Height: 182.9 cm (72\")     Body mass index is 24.82 kg/m².    PHYSICAL EXAM:    Constitutional:       General: Not in acute distress.     Appearance: Normal appearance. Well-developed.   Eyes:      Pupils: Pupils are equal, round, and reactive to light.   HENT:      Head: Normocephalic.   Neck:      Vascular: No carotid bruit or JVD.   Pulmonary:      Effort: Pulmonary effort is normal. No tachypnea.      Breath sounds: Normal breath sounds. No wheezing. No rales.   Cardiovascular:      " Normal rate. Regular rhythm.      No gallop.    Pulses:     Intact distal pulses.   Edema:     Peripheral edema absent.   Abdominal:      General: Bowel sounds are normal.      Palpations: Abdomen is soft.      Tenderness: There is no abdominal tenderness.   Musculoskeletal: Normal range of motion.      Cervical back: Normal range of motion and neck supple. No edema. Skin:     General: Skin is warm and dry.   Neurological:      Mental Status: Alert and oriented to person, place, and time.         ECG 12 Lead    Date/Time: 5/5/2025 11:21 AM  Performed by: Ashlyn Foster APRN    Authorized by: Ashlyn Foster APRN  Comparison: compared with previous ECG from 10/10/2023  Similar to previous ECG  Rhythm: atrial fibrillation  Rate: normal  Conduction: right bundle branch block    Clinical impression: abnormal EKG              Assessment/Plan:      1.  Hypertrophic cardiomyopathy-stable on carvedilol 25 mg twice a day.  No symptoms.  History of alcohol septal ablation in 2003 no residual gradient on echo in 2020    3.  Persistent atrial fibrillation-anticoagulated with Xarelto 20 mg daily.  Cost of Xarelto has been a lot now that he is on Medicare if he had to meet his deductible I have given him some samples.     Follow-up in 1 year or sooner if needed         Your medication list            Accurate as of May 5, 2025 10:59 AM. If you have any questions, ask your nurse or doctor.                CONTINUE taking these medications        Instructions Last Dose Given Next Dose Due   carvedilol 25 MG tablet  Commonly known as: COREG      Take 1 tablet by mouth 2 (Two) Times a Day.       losartan 50 MG tablet  Commonly known as: COZAAR      Take 1 tablet by mouth Daily.       rivaroxaban 20 MG tablet  Commonly known as: Xarelto      Take 1 tablet by mouth Daily.                  As always, it has been a pleasure to participate in your patient's care.      Sincerely,     Ashlyn VARGAS

## 2025-08-15 RX ORDER — RIVAROXABAN 20 MG/1
20 TABLET, FILM COATED ORAL DAILY
Qty: 90 TABLET | Refills: 3 | Status: SHIPPED | OUTPATIENT
Start: 2025-08-15